# Patient Record
Sex: FEMALE | Race: WHITE | NOT HISPANIC OR LATINO | Employment: FULL TIME | URBAN - NONMETROPOLITAN AREA
[De-identification: names, ages, dates, MRNs, and addresses within clinical notes are randomized per-mention and may not be internally consistent; named-entity substitution may affect disease eponyms.]

---

## 2019-10-01 LAB
C TRACH RRNA SPEC DONR QL NAA+PROBE: NEGATIVE
EXTERNAL ABO GROUPING: NORMAL
EXTERNAL ANTIBODY SCREEN: NORMAL
EXTERNAL HEMATOCRIT: 38 %
EXTERNAL HEMOGLOBIN: 12.5 G/DL
EXTERNAL HEPATITIS B SURFACE ANTIGEN: NEGATIVE
EXTERNAL RH FACTOR: POSITIVE
EXTERNAL SYPHILIS RPR SCREEN: NORMAL
HCV AB S/CO SERPL IA: NEGATIVE
HIV 1+2 AB+HIV1 P24 AG SERPL QL IA: NORMAL
N GONORRHOEA DNA SPEC QL NAA+PROBE: NEGATIVE
RUBV IGG SERPL IA-ACNC: NORMAL

## 2019-11-14 ENCOUNTER — HOSPITAL ENCOUNTER (EMERGENCY)
Facility: HOSPITAL | Age: 24
Discharge: LEFT BEFORE DISCHARGE | End: 2019-11-15

## 2019-11-14 VITALS
HEIGHT: 63 IN | TEMPERATURE: 98.8 F | OXYGEN SATURATION: 100 % | BODY MASS INDEX: 24.45 KG/M2 | HEART RATE: 113 BPM | RESPIRATION RATE: 18 BRPM | WEIGHT: 138 LBS | SYSTOLIC BLOOD PRESSURE: 116 MMHG | DIASTOLIC BLOOD PRESSURE: 72 MMHG

## 2020-01-28 LAB
EXTERNAL HEMATOCRIT: 36 %
EXTERNAL HEMOGLOBIN: 11.6 G/DL
GLUCOSE 1H P 100 G GLC PO SERPL-MCNC: 94 MG/DL (ref 74–180)

## 2020-02-03 ENCOUNTER — HOSPITAL ENCOUNTER (OUTPATIENT)
Facility: HOSPITAL | Age: 25
Discharge: HOME | End: 2020-02-03
Attending: MIDWIFE | Admitting: MIDWIFE

## 2020-02-03 VITALS
DIASTOLIC BLOOD PRESSURE: 69 MMHG | WEIGHT: 148 LBS | TEMPERATURE: 98.9 F | SYSTOLIC BLOOD PRESSURE: 111 MMHG | OXYGEN SATURATION: 100 % | BODY MASS INDEX: 26.22 KG/M2 | HEART RATE: 103 BPM | RESPIRATION RATE: 16 BRPM

## 2020-02-03 LAB
BILIRUB BLD-MCNC: NEGATIVE MG/DL
CLARITY, POC: CLEAR
COLOR UR: YELLOW
GLUCOSE UR STRIP-MCNC: NEGATIVE MG/DL
KETONES UR QL: NEGATIVE
LEUKOCYTE EST, POC: NEGATIVE
NITRITE UR-MCNC: NEGATIVE MG/ML
PH UR: 5.5 [PH] (ref 5–8)
PROT UR STRIP-MCNC: NEGATIVE MG/DL
RBC # UR STRIP: NEGATIVE /UL
SP GR UR: 1.01 (ref 1–1.03)
UROBILINOGEN UR QL: NORMAL

## 2020-02-03 PROCEDURE — G0463 HOSPITAL OUTPT CLINIC VISIT: HCPCS

## 2020-02-03 PROCEDURE — 81002 URINALYSIS NONAUTO W/O SCOPE: CPT | Performed by: MIDWIFE

## 2020-02-03 RX ORDER — PRENATAL VIT NO.126/IRON/FOLIC 28MG-0.8MG
TABLET ORAL DAILY
COMMUNITY
End: 2021-04-01

## 2020-02-04 NOTE — SIGNIFICANT NOTE
Pt reports being constipated and was straining during bowel movement today and that is when she saw pink blood on the paper. Pt reports not seeing anymore blood since. Abd. soft non tender, no active bleeding, and no leaking of fluid. Positive fetal movement.

## 2020-02-04 NOTE — NON STRESS TEST
"Triage Note - Nursing Documentation  Labor and Delivery Admission Log    Della Garcia  : 1995  MRN: [id number]  CSN: [id number]    Date in / Time in:  2/3/2020  Time in:   Date out / Time out:    Time out:   Nurse: Eileen Luciano, RN    Patient Info: She is a 25 y.o. year old  at 25w1d with an MINH of 2020, Date entered prior to episode creation who was seen on the [facility].    Chief Complaint:   Chief Complaint   Patient presents with   • Vaginal Bleeding     \"I saw a small spot of pink blood when I wiped\".       Provider Instructions / Disposition: home self care. Pt is to follow up with OB provider  There is no problem list on file for this patient.      NST Documentation (Only applicable > 32 weeks):      "

## 2020-03-06 ENCOUNTER — INITIAL PRENATAL (OUTPATIENT)
Dept: OBSTETRICS AND GYNECOLOGY | Facility: CLINIC | Age: 25
End: 2020-03-06

## 2020-03-06 VITALS — WEIGHT: 156 LBS | BODY MASS INDEX: 27.63 KG/M2 | DIASTOLIC BLOOD PRESSURE: 68 MMHG | SYSTOLIC BLOOD PRESSURE: 114 MMHG

## 2020-03-06 DIAGNOSIS — Z34.93 PRENATAL CARE IN THIRD TRIMESTER: Primary | ICD-10-CM

## 2020-03-06 PROCEDURE — 99203 OFFICE O/P NEW LOW 30 MIN: CPT | Performed by: OBSTETRICS & GYNECOLOGY

## 2020-03-20 NOTE — PROGRESS NOTES
Subjective   Chief Complaint   Patient presents with   • Initial Prenatal Visit     Transfer from Dr. Quintero, patient states MINH is 20, she forgot records at home, she will bring them at next appointment.       Della Garcia is a 25 y.o. year old .  Patient's last menstrual period was 2019 (exact date).  She presents to be seen to initiate prenatal care.     No complaints.  No issues with this pregnancy.  First pregnancy.  No medications outside of a prenatal vitamin.  She does not have prenatal records with her today.  She reports normal prenatal labs and passage of her Glucola.    She denies nausea, emesis, fevers, chills, significant weight changes, hair/skin/nail changes, dysuria, urinary frequency, palpitations, myalgia, dyspnea.     Social History    Tobacco Use      Smoking status: Never Smoker      Smokeless tobacco: Never Used      Review of Systems  Pertinent items are noted in HPI, all other systems reviewed and negative    History:  The following portions of the patient's history were reviewed and updated as appropriate:vital signs, allergies, current medications, past medical history, past social history, past surgical history and problem list.    Current Outpatient Medications on File Prior to Visit   Medication Sig Dispense Refill   • Prenatal Vit-Fe Fumarate-FA (PRENATAL, CLASSIC, VITAMIN) 28-0.8 MG tablet tablet Take  by mouth Daily.       No current facility-administered medications on file prior to visit.           Objective   /68   Wt 70.8 kg (156 lb)   LMP 2019 (Exact Date)   BMI 27.63 kg/m²     Physical Exam:  General Appearance: alert, pleasant, appears stated age, interactive and cooperative  Head: normocephalic, without obvious abnormality and atraumatic  Eyes: lids and lashes normal and no icterus  Ears: ears appear intact with no abnormalities noted  Nose: nares normal, septum midline, mucosa normal and no drainage  Neck: suppple, trachea midline and no  thyromegaly  Back: no kyphosis present, no scoliosis present and range of motion normal  Lungs: respirations regular, respirations even and respirations unlabored, clear to auscultation bilaterally   Heart: regular rhythm and normal rate, normal S1, S2, no murmur, gallop, or rubs and no click  Breasts: Not performed.  Abdomen: no masses, no hepatomegaly, no splenomegaly, soft non-tender, no guarding and no rebound tenderness  Extremities: moves extremities well, no edema, no cyanosis and no redness  Skin: no bleeding, bruising or rash and no lesions noted  Lymph Nodes: no palpable adenopathy  Neurologic: Cranial Nerves cranial nerves 2 - 12 grossly intact, Speech normal content and execusion, Coordination normal  Psych: normal mood and affect, oriented to person, time and place, thought content organized and appropriate judgment    Lab Review   No data reviewed    Imaging   No data reviewed       Assessment   1. IUP at 29+6 weeks  2. Supervision of low risk pregnancy     Plan    1. The problem list for pregnancy was initiated today  2. Tests/Orders/Rx for today:  No orders of the defined types were placed in this encounter.    Meds Ordered: none  3. Testing for GC / Chlamydia / trichomonas was recently done and will not need to be repeated  4. Genetic testing: none  5. Information reviewed: exercise in pregnancy, nutrition in pregnancy, weight gain in pregnancy, work and travel restrictions during pregnancy, list of OTC medications acceptable in pregnancy and call coverage groups    Follow up: 2 week(s) for growth U/S.  Bring prenatal records for review next visit.    Darin Alexis MD  Obstetrics and Gynecology  [facility]

## 2020-03-24 ENCOUNTER — ROUTINE PRENATAL (OUTPATIENT)
Dept: OBSTETRICS AND GYNECOLOGY | Facility: CLINIC | Age: 25
End: 2020-03-24

## 2020-03-24 VITALS — DIASTOLIC BLOOD PRESSURE: 68 MMHG | WEIGHT: 162 LBS | BODY MASS INDEX: 28.7 KG/M2 | SYSTOLIC BLOOD PRESSURE: 120 MMHG

## 2020-03-24 DIAGNOSIS — Z34.93 PRENATAL CARE IN THIRD TRIMESTER: Primary | ICD-10-CM

## 2020-03-24 LAB
GLUCOSE UR STRIP-MCNC: NEGATIVE MG/DL
KETONES UR QL: NEGATIVE
NITRITE UR-MCNC: NEGATIVE MG/ML
PROT UR STRIP-MCNC: NEGATIVE MG/DL
RBC # UR STRIP: NEGATIVE /UL

## 2020-03-24 PROCEDURE — 99213 OFFICE O/P EST LOW 20 MIN: CPT | Performed by: OBSTETRICS & GYNECOLOGY

## 2020-03-24 NOTE — PROGRESS NOTES
Chief Complaint   Patient presents with   • Routine Prenatal Visit     Growth Scan, No C/C        HPI:   , 32w3d gestation reports doing well    ROS:  See Prenatal Episode/Flowsheet  /68   Wt 73.5 kg (162 lb)   LMP 2019 (Exact Date)   BMI 28.70 kg/m²      EXAM:  EXTREMITIES:  No swelling-See Prenatal Episode/Flowsheet    ABDOMEN:  FHTs/Movement noted-See Prenatal Episode/Flowsheet    URINE GLUCOSE/PROTEIN:  See Prenatal Episode/Flowsheet    PELVIC EXAM:  See Prenatal Episode/Flowsheet  CV:  Lungs:  GYN:    MDM:    Lab Results   Component Value Date    URINECX Final report 09/15/2019       U/S: Overall growth 52 percentile.  JULIANA is 10.33.  Vertex.  Posterior placenta.  Active female fetus.    1. IUP 32w3d  2. Routine care   3. Normal growth.

## 2020-04-21 ENCOUNTER — ROUTINE PRENATAL (OUTPATIENT)
Dept: OBSTETRICS AND GYNECOLOGY | Facility: CLINIC | Age: 25
End: 2020-04-21

## 2020-04-21 VITALS — SYSTOLIC BLOOD PRESSURE: 122 MMHG | BODY MASS INDEX: 30.82 KG/M2 | WEIGHT: 174 LBS | DIASTOLIC BLOOD PRESSURE: 70 MMHG

## 2020-04-21 DIAGNOSIS — Z36.85 ANTENATAL SCREENING FOR STREPTOCOCCUS B: Primary | ICD-10-CM

## 2020-04-21 DIAGNOSIS — Z34.03 ENCOUNTER FOR SUPERVISION OF NORMAL FIRST PREGNANCY IN THIRD TRIMESTER: ICD-10-CM

## 2020-04-21 PROCEDURE — 99213 OFFICE O/P EST LOW 20 MIN: CPT | Performed by: MIDWIFE

## 2020-04-21 NOTE — PROGRESS NOTES
Chief Complaint   Patient presents with   • Routine Prenatal Visit     GBS done today, c/o pelvic pain        HPI: Della is a  currently at 36w3d who today reports the following:  Contractions - No; Leaking - No; Vaginal bleeding -  No; Swelling of extremities - hands and feet intermittently; Baby is active - YES She is having some mild discomfort in right groin for several days. It is mostly dull and achy. She palns on breastfeeding and has gotten a pump. She is planning on epidural in labor.    ROS:   GI:   Nausea - No; Constipation - No   Neuro:  Headache - No; Visual disturbances - No.    Social History    Tobacco Use      Smoking status: Never Smoker      Smokeless tobacco: Never Used      EXAM:   Vitals:  See prenatal flowsheet, /70, Wt +12#   Abdomen:   See prenatal flowsheet as noted and reviewed, soft, nontender   Pelvic:  See prenatal flowsheet   Urine:  See prenatal flowsheet as noted and reviewed    MDM:  Impression: Supervision of low risk pregnancy  Groin discomfort   Tests done today: GBS testing   Topics discussed: breast feeding  kick counts and fetal movement  labor signs and symptoms   Tests next visit: none   Next visit: 1 weeks     This note was electronically signed.  Trini Brown, TRISH  2020

## 2020-04-23 LAB — GP B STREP DNA SPEC QL NAA+PROBE: NEGATIVE

## 2020-04-28 ENCOUNTER — ROUTINE PRENATAL (OUTPATIENT)
Dept: OBSTETRICS AND GYNECOLOGY | Facility: CLINIC | Age: 25
End: 2020-04-28

## 2020-04-28 VITALS — SYSTOLIC BLOOD PRESSURE: 122 MMHG | DIASTOLIC BLOOD PRESSURE: 70 MMHG | BODY MASS INDEX: 31.18 KG/M2 | WEIGHT: 176 LBS

## 2020-04-28 DIAGNOSIS — Z34.93 THIRD TRIMESTER PREGNANCY: Primary | ICD-10-CM

## 2020-04-28 PROCEDURE — 99213 OFFICE O/P EST LOW 20 MIN: CPT | Performed by: OBSTETRICS & GYNECOLOGY

## 2020-04-28 NOTE — PROGRESS NOTES
Chief Complaint   Patient presents with   • Routine Prenatal Visit     No Complaints/concerns, Good fetal movement         HPI:   , 37w3d gestation reports doing well    ROS:  See Prenatal Episode/Flowsheet  /70   Wt 79.8 kg (176 lb)   LMP 2019 (Exact Date)   BMI 31.18 kg/m²      EXAM:  EXTREMITIES:  No swelling-See Prenatal Episode/Flowsheet    ABDOMEN:  FHTs/Movement noted-See Prenatal Episode/Flowsheet    URINE GLUCOSE/PROTEIN:  See Prenatal Episode/Flowsheet    PELVIC EXAM:  See Prenatal Episode/Flowsheet  CV:  Lungs:  GYN:    MDM:    Lab Results   Component Value Date    HGB 11.6 2020    RUBELLAABIGG Immune 10/01/2019    HEPBSAG Negative 10/01/2019    ABO O 10/01/2019    RH Positive 10/01/2019    ABSCRN Normal 10/01/2019    [id number] Non-Reactive 10/01/2019    HEPCVIRUSABY Negative 10/01/2019    STREPGPB Negative 2020    URINECX Final report 09/15/2019       U/S: 52% @ 32 weeks      1. IUP 37w3d  2. Routine care   3. GBS negative  4. Check cervix next time

## 2020-05-04 ENCOUNTER — HOSPITAL ENCOUNTER (OUTPATIENT)
Facility: HOSPITAL | Age: 25
Discharge: HOME | End: 2020-05-04
Attending: MIDWIFE | Admitting: MIDWIFE

## 2020-05-04 VITALS
TEMPERATURE: 98.7 F | HEART RATE: 106 BPM | OXYGEN SATURATION: 99 % | HEIGHT: 63 IN | SYSTOLIC BLOOD PRESSURE: 120 MMHG | DIASTOLIC BLOOD PRESSURE: 75 MMHG | WEIGHT: 176 LBS | BODY MASS INDEX: 31.18 KG/M2 | RESPIRATION RATE: 14 BRPM

## 2020-05-04 LAB
BILIRUB BLD-MCNC: NEGATIVE MG/DL
CLARITY, POC: CLEAR
COLOR UR: YELLOW
GLUCOSE UR STRIP-MCNC: NEGATIVE MG/DL
KETONES UR QL: NEGATIVE
LEUKOCYTE EST, POC: NEGATIVE
NITRITE UR-MCNC: NEGATIVE MG/ML
PH UR: 6 [PH] (ref 5–8)
PROT UR STRIP-MCNC: NEGATIVE MG/DL
RBC # UR STRIP: NEGATIVE /UL
SP GR UR: 1 (ref 1–1.03)
UROBILINOGEN UR QL: NORMAL

## 2020-05-04 PROCEDURE — G0463 HOSPITAL OUTPT CLINIC VISIT: HCPCS

## 2020-05-04 PROCEDURE — 59025 FETAL NON-STRESS TEST: CPT

## 2020-05-04 PROCEDURE — 59025 FETAL NON-STRESS TEST: CPT | Performed by: MIDWIFE

## 2020-05-04 PROCEDURE — 81002 URINALYSIS NONAUTO W/O SCOPE: CPT | Performed by: MIDWIFE

## 2020-05-04 NOTE — NON STRESS TEST
Triage Note - Nursing Documentation  Labor and Delivery Admission Log    Della Garcia  : 1995  MRN: [id number]  CSN: [id number]    Date in / Time in:  2020  Time in: 1101    Date out / Time out:    Time out: 1154    Nurse: Rizwana Hendricks RN    Patient Info: She is a 25 y.o. year old  at 38w2d with an MINH of 2020, by Other [employer]asis who was seen on the [facility].    Chief Complaint:   Chief Complaint   Patient presents with   • Non-stress Test     Decreased fetal movement       Provider Instructions / Disposition: reactive nst, active FETUS, PO hydrated, sent home    There is no problem list on file for this patient.      NST Documentation (Only applicable > 32 weeks): Interpretation A  Nonstress Test Interpretation A: Reactive (20 1145 : Rizwana Hendricks RN)

## 2020-05-05 ENCOUNTER — ROUTINE PRENATAL (OUTPATIENT)
Dept: OBSTETRICS AND GYNECOLOGY | Facility: CLINIC | Age: 25
End: 2020-05-05

## 2020-05-05 VITALS — WEIGHT: 177 LBS | DIASTOLIC BLOOD PRESSURE: 72 MMHG | BODY MASS INDEX: 31.35 KG/M2 | SYSTOLIC BLOOD PRESSURE: 124 MMHG

## 2020-05-05 DIAGNOSIS — Z34.03 ENCOUNTER FOR SUPERVISION OF NORMAL FIRST PREGNANCY IN THIRD TRIMESTER: Primary | ICD-10-CM

## 2020-05-05 PROCEDURE — 99213 OFFICE O/P EST LOW 20 MIN: CPT | Performed by: OBSTETRICS & GYNECOLOGY

## 2020-05-05 NOTE — PROGRESS NOTES
Chief Complaint   Patient presents with   • Routine Prenatal Visit     Patient complains of contractions.        HPI: Della is a  currently at 38w3d who today reports the following:  Contractions - YES - but less than 4/hour AND no associated change in vaginal discharge; Leaking - No; Vaginal bleeding -  No; Heartburn - No.  Pt was seen on labor das yesterday with decreased fetal movement.  Pt reports good fetal movement now.  Pt with complaints of irregular contractions; worse during the night.  Pt with no spotting or leaking of fluid.  ROS:   GI:   Nausea - No; Constipation - No; Diarrhea - No.   Neuro:  Headache - No; Visual disturbances - No.    EXAM:   Vitals:  See prenatal flowsheet as noted and reviewed   General: Alert, cooperative, and does not appear in any distress   Lungs:  Respirations regular, even and unlabored   Abdomen:   See prenatal flowsheet as noted and reviewed     Uterus gravid, non-tender; no palpable masses     No guarding or rebound tenderness   Pelvic:  See prenatal flowsheet as noted and reviewed   Ext:  See prenatal flowsheet as noted and reviewed     Moves extremities well, no cyanosis and no redness   Skin:  No bleeding, bruising or rash and no lesions noted   Psych:  Normal mood and affect, oriented and thought content appropriate   Urine:  See prenatal flowsheet as noted and reviewed    Prenatal Labs  Lab Results   Component Value Date    HGB 11.6 2020    RUBELLAABIGG Immune 10/01/2019    HEPBSAG Negative 10/01/2019    ABO O 10/01/2019    RH Positive 10/01/2019    ABSCRN Normal 10/01/2019    [id number] Non-Reactive 10/01/2019    HEPCVIRUSABY Negative 10/01/2019    STREPGPB Negative 2020    URINECX Final report 09/15/2019       MDM:  Impression: Supervision of low risk pregnancy  Irregular contractions   GBS negative   Tests done today: none   Topics discussed: kick counts and fetal movement  labor signs and symptoms  PIH precautions   Tests next visit: none     This  note was electronically signed.  Alisa Harris M.D.

## 2020-05-06 ENCOUNTER — ANESTHESIA EVENT (OUTPATIENT)
Dept: LABOR AND DELIVERY | Facility: HOSPITAL | Age: 25
End: 2020-05-06

## 2020-05-06 ENCOUNTER — HOSPITAL ENCOUNTER (INPATIENT)
Facility: HOSPITAL | Age: 25
LOS: 2 days | Discharge: HOME | End: 2020-05-08
Attending: MIDWIFE | Admitting: NURSE PRACTITIONER

## 2020-05-06 ENCOUNTER — ANESTHESIA (OUTPATIENT)
Dept: LABOR AND DELIVERY | Facility: HOSPITAL | Age: 25
End: 2020-05-06

## 2020-05-06 PROBLEM — Z34.90 PREGNANCY: Status: ACTIVE | Noted: 2020-05-06

## 2020-05-06 LAB
A1 MICROGLOB PLACENTAL VAG QL: POSITIVE
ABO GROUP BLD: NORMAL
ABO GROUP BLD: NORMAL
BASOPHILS # BLD AUTO: 0.04 10*3/MM3 (ref 0–0.2)
BASOPHILS NFR BLD AUTO: 0.4 % (ref 0–1.5)
BLD GP AB SCN SERPL QL: NEGATIVE
DEPRECATED RDW RBC AUTO: 47.9 FL (ref 37–54)
EOSINOPHIL # BLD AUTO: 0.14 10*3/MM3 (ref 0–0.4)
EOSINOPHIL NFR BLD AUTO: 1.3 % (ref 0.3–6.2)
ERYTHROCYTE [DISTWIDTH] IN BLOOD BY AUTOMATED COUNT: 14.2 % (ref 12.3–15.4)
HCT VFR BLD AUTO: 36.1 % (ref 34–46.6)
HGB BLD-MCNC: 12.1 G/DL (ref 12–15.9)
IMM GRANULOCYTES # BLD AUTO: 0.11 10*3/MM3 (ref 0–0.05)
IMM GRANULOCYTES NFR BLD AUTO: 1 % (ref 0–0.5)
LYMPHOCYTES # BLD AUTO: 1.85 10*3/MM3 (ref 0.7–3.1)
LYMPHOCYTES NFR BLD AUTO: 17.6 % (ref 19.6–45.3)
MCH RBC QN AUTO: 30.9 PG (ref 26.6–33)
MCHC RBC AUTO-ENTMCNC: 33.5 G/DL (ref 31.5–35.7)
MCV RBC AUTO: 92.3 FL (ref 79–97)
MONOCYTES # BLD AUTO: 0.62 10*3/MM3 (ref 0.1–0.9)
MONOCYTES NFR BLD AUTO: 5.9 % (ref 5–12)
NEUTROPHILS # BLD AUTO: 7.73 10*3/MM3 (ref 1.7–7)
NEUTROPHILS NFR BLD AUTO: 73.8 % (ref 42.7–76)
NRBC BLD AUTO-RTO: 0 /100 WBC (ref 0–0.2)
PLATELET # BLD AUTO: 222 10*3/MM3 (ref 140–450)
PMV BLD AUTO: 10.5 FL (ref 6–12)
RBC # BLD AUTO: 3.91 10*6/MM3 (ref 3.77–5.28)
RH BLD: POSITIVE
RH BLD: POSITIVE
T&S EXPIRATION DATE: NORMAL
WBC NRBC COR # BLD: 10.49 10*3/MM3 (ref 3.4–10.8)

## 2020-05-06 PROCEDURE — 59025 FETAL NON-STRESS TEST: CPT

## 2020-05-06 PROCEDURE — 36415 COLL VENOUS BLD VENIPUNCTURE: CPT | Performed by: MIDWIFE

## 2020-05-06 PROCEDURE — 86900 BLOOD TYPING SEROLOGIC ABO: CPT | Performed by: MIDWIFE

## 2020-05-06 PROCEDURE — 25010000002 MORPHINE PER 10 MG: Performed by: MIDWIFE

## 2020-05-06 PROCEDURE — 86901 BLOOD TYPING SEROLOGIC RH(D): CPT | Performed by: MIDWIFE

## 2020-05-06 PROCEDURE — 86900 BLOOD TYPING SEROLOGIC ABO: CPT

## 2020-05-06 PROCEDURE — G0463 HOSPITAL OUTPT CLINIC VISIT: HCPCS

## 2020-05-06 PROCEDURE — 85025 COMPLETE CBC W/AUTO DIFF WBC: CPT | Performed by: MIDWIFE

## 2020-05-06 PROCEDURE — 51703 INSERT BLADDER CATH COMPLEX: CPT

## 2020-05-06 PROCEDURE — 86850 RBC ANTIBODY SCREEN: CPT | Performed by: MIDWIFE

## 2020-05-06 PROCEDURE — 84112 EVAL AMNIOTIC FLUID PROTEIN: CPT | Performed by: MIDWIFE

## 2020-05-06 PROCEDURE — 86901 BLOOD TYPING SEROLOGIC RH(D): CPT

## 2020-05-06 RX ORDER — PROMETHAZINE HYDROCHLORIDE 12.5 MG/1
12.5 SUPPOSITORY RECTAL EVERY 4 HOURS PRN
Status: DISCONTINUED | OUTPATIENT
Start: 2020-05-06 | End: 2020-05-07 | Stop reason: HOSPADM

## 2020-05-06 RX ORDER — MORPHINE SULFATE 4 MG/ML
4 INJECTION, SOLUTION INTRAMUSCULAR; INTRAVENOUS EVERY 4 HOURS PRN
Status: DISCONTINUED | OUTPATIENT
Start: 2020-05-06 | End: 2020-05-07 | Stop reason: HOSPADM

## 2020-05-06 RX ORDER — ONDANSETRON 2 MG/ML
4 INJECTION INTRAMUSCULAR; INTRAVENOUS ONCE AS NEEDED
Status: DISCONTINUED | OUTPATIENT
Start: 2020-05-06 | End: 2020-05-07 | Stop reason: HOSPADM

## 2020-05-06 RX ORDER — MORPHINE SULFATE 2 MG/ML
6 INJECTION, SOLUTION INTRAMUSCULAR; INTRAVENOUS EVERY 4 HOURS PRN
Status: DISCONTINUED | OUTPATIENT
Start: 2020-05-06 | End: 2020-05-07 | Stop reason: HOSPADM

## 2020-05-06 RX ORDER — PROMETHAZINE HYDROCHLORIDE 25 MG/ML
12.5 INJECTION, SOLUTION INTRAMUSCULAR; INTRAVENOUS EVERY 4 HOURS PRN
Status: DISCONTINUED | OUTPATIENT
Start: 2020-05-06 | End: 2020-05-07 | Stop reason: HOSPADM

## 2020-05-06 RX ORDER — PROMETHAZINE HYDROCHLORIDE 25 MG/ML
25 INJECTION, SOLUTION INTRAMUSCULAR; INTRAVENOUS EVERY 4 HOURS PRN
Status: DISCONTINUED | OUTPATIENT
Start: 2020-05-06 | End: 2020-05-07 | Stop reason: HOSPADM

## 2020-05-06 RX ORDER — SODIUM CHLORIDE, SODIUM LACTATE, POTASSIUM CHLORIDE, CALCIUM CHLORIDE 600; 310; 30; 20 MG/100ML; MG/100ML; MG/100ML; MG/100ML
125 INJECTION, SOLUTION INTRAVENOUS CONTINUOUS
Status: DISCONTINUED | OUTPATIENT
Start: 2020-05-06 | End: 2020-05-07

## 2020-05-06 RX ORDER — PROMETHAZINE HYDROCHLORIDE 25 MG/ML
25 INJECTION, SOLUTION INTRAMUSCULAR; INTRAVENOUS ONCE
Status: DISCONTINUED | OUTPATIENT
Start: 2020-05-06 | End: 2020-05-06

## 2020-05-06 RX ORDER — SODIUM CHLORIDE 0.9 % (FLUSH) 0.9 %
1-10 SYRINGE (ML) INJECTION AS NEEDED
Status: DISCONTINUED | OUTPATIENT
Start: 2020-05-06 | End: 2020-05-07 | Stop reason: HOSPADM

## 2020-05-06 RX ORDER — TRISODIUM CITRATE DIHYDRATE AND CITRIC ACID MONOHYDRATE 500; 334 MG/5ML; MG/5ML
30 SOLUTION ORAL ONCE
Status: DISCONTINUED | OUTPATIENT
Start: 2020-05-06 | End: 2020-05-07 | Stop reason: HOSPADM

## 2020-05-06 RX ORDER — LIDOCAINE HYDROCHLORIDE 10 MG/ML
5 INJECTION, SOLUTION EPIDURAL; INFILTRATION; INTRACAUDAL; PERINEURAL AS NEEDED
Status: DISCONTINUED | OUTPATIENT
Start: 2020-05-06 | End: 2020-05-07 | Stop reason: HOSPADM

## 2020-05-06 RX ORDER — SODIUM CHLORIDE 0.9 % (FLUSH) 0.9 %
3 SYRINGE (ML) INJECTION EVERY 12 HOURS SCHEDULED
Status: DISCONTINUED | OUTPATIENT
Start: 2020-05-06 | End: 2020-05-07 | Stop reason: HOSPADM

## 2020-05-06 RX ORDER — PROMETHAZINE HYDROCHLORIDE 12.5 MG/1
12.5 TABLET ORAL EVERY 4 HOURS PRN
Status: DISCONTINUED | OUTPATIENT
Start: 2020-05-06 | End: 2020-05-07 | Stop reason: HOSPADM

## 2020-05-06 RX ORDER — OXYTOCIN/0.9 % SODIUM CHLORIDE 30/500 ML
1 PLASTIC BAG, INJECTION (ML) INTRAVENOUS
Status: DISCONTINUED | OUTPATIENT
Start: 2020-05-06 | End: 2020-05-07

## 2020-05-06 RX ADMIN — SODIUM CHLORIDE, POTASSIUM CHLORIDE, SODIUM LACTATE AND CALCIUM CHLORIDE 125 ML/HR: 600; 310; 30; 20 INJECTION, SOLUTION INTRAVENOUS at 17:25

## 2020-05-06 RX ADMIN — Medication 2 MILLI-UNITS/MIN: at 18:44

## 2020-05-06 RX ADMIN — MORPHINE SULFATE 4 MG: 4 INJECTION, SOLUTION INTRAMUSCULAR; INTRAVENOUS at 22:02

## 2020-05-06 NOTE — ANESTHESIA PREPROCEDURE EVALUATION
Anesthesia Evaluation     Patient summary reviewed and Nursing notes reviewed   NPO Solid Status: > 8 hours  NPO Liquid Status: > 8 hours           Airway   Mallampati: I  TM distance: >3 FB  Neck ROM: full  No difficulty expected  Dental - normal exam     Pulmonary - negative pulmonary ROS and normal exam   Cardiovascular - negative cardio ROS and normal exam        Neuro/Psych- negative ROS  GI/Hepatic/Renal/Endo - negative ROS     Musculoskeletal (-) negative ROS    Abdominal  - normal exam    Bowel sounds: normal.   Substance History - negative use     OB/GYN    (+) Pregnant,         Other                        Anesthesia Plan    ASA 2 - emergent     epidural     intravenous induction     Anesthetic plan, all risks, benefits, and alternatives have been provided, discussed and informed consent has been obtained with: patient.    Plan discussed with attending.

## 2020-05-06 NOTE — H&P
" Fam Garcia  : 1995  MRN: [id number]  CSN: [id number]    History and Physical    Subjective   Della Garcia is a 25 y.o. year old  with an Estimated Date of Delivery: 20 currently at 38w4d presenting with irregular contractions, leaking fluid and normal fetal movement. Her contractions started this am, irregular. Her water broke at 1600.     Prenatal care has been with Dr Quintero in [place] and then transfer to [facility] @ 29w6d.  It has been benign. First PNV on 10/1/2019 @ 7 weeks with 12 visits. Weight gain = 39 lbs.    OB History    Para Term  AB Living   1 0 0 0 0 0   SAB TAB Ectopic Molar Multiple Live Births   0 0 0 0 0 0      # Outcome Date GA Lbr Eladio/2nd Weight Sex Delivery Anes PTL Lv   1 Current              Past Medical History:   Diagnosis Date   • Varicella      History reviewed. No pertinent surgical history.    Allergies   Allergen Reactions   • Cephalosporins Rash   • Albuterol Hives     Social History    Tobacco Use      Smoking status: Never Smoker      Smokeless tobacco: Never Used    Review of Systems   Constitutional: Negative.    Respiratory: Negative.    Cardiovascular: Negative.    Gastrointestinal: Negative.    Genitourinary: Negative.    Musculoskeletal: Negative.    Skin: Negative.    Neurological: Negative.    Psychiatric/Behavioral: Negative.    All other systems reviewed and are negative.        Objective   /76   Pulse 106   Temp 98.9 °F (37.2 °C) (Oral)   Ht 160 cm (63\")   Wt 80.3 kg (177 lb)   LMP 2019 (Exact Date)   SpO2 98%   Breastfeeding Yes   BMI 31.35 kg/m²   General: well developed; well nourished  no acute distress   Neck:    Heart:   supple and no masses  normal apical impulse  regular rate and rhythm   Lungs: breathing is unlabored  clear to auscultation bilaterally   Abdomen: soft, non-tender; no masses  gravid  EFW: 7#   FHT's: reassuring, reactive and category 1   Cervix: was checked (by me): 2 cm " / 60 % / -1 posterior soft  Leaking fluid   Presentation: cephalic   Contractions: irregular - external monitors used   Extremities:   normal appearance with no cyanosis or edema and no calf tenderness   Skin:  Skin color, texture, turgor normal. No rashes or lesions or Skin warm and dry   Psych:  Normal. and Alert and oriented, appropriate affect.     Prenatal Labs  Lab Results   Component Value Date    HGB 12.1 05/06/2020    HEPBSAG Negative 10/01/2019    ABSCRN Normal 10/01/2019    [id number] Non-Reactive 10/01/2019    HEPCVIRUSABY Negative 10/01/2019    STREPGPB Negative 04/21/2020    URINECX Final report 09/15/2019       Current Labs Reviewed   Lab Results (last 24 hours)     Procedure Component Value Units Date/Time    CBC & Differential [[id number]] Collected:  05/06/20 1743    Specimen:  Blood Updated:  05/06/20 1755    Narrative:       The following orders were created for panel order CBC & Differential.  Procedure                               Abnormality         Status                     ---------                               -----------         ------                     CBC Auto Differential[[id number]]        Abnormal            Final result                 Please view results for these tests on the individual orders.    CBC Auto Differential [[id number]]  (Abnormal) Collected:  05/06/20 1743    Specimen:  Blood Updated:  05/06/20 1755     WBC 10.49 10*3/mm3      RBC 3.91 10*6/mm3      Hemoglobin 12.1 g/dL      Hematocrit 36.1 %      MCV 92.3 fL      MCH 30.9 pg      MCHC 33.5 g/dL      RDW 14.2 %      RDW-SD 47.9 fl      MPV 10.5 fL      Platelets 222 10*3/mm3      Neutrophil % 73.8 %      Lymphocyte % 17.6 %      Monocyte % 5.9 %      Eosinophil % 1.3 %      Basophil % 0.4 %      Immature Grans % 1.0 %      Neutrophils, Absolute 7.73 10*3/mm3      Lymphocytes, Absolute 1.85 10*3/mm3      Monocytes, Absolute 0.62 10*3/mm3      Eosinophils, Absolute 0.14 10*3/mm3      Basophils, Absolute 0.04 10*3/mm3       Immature Grans, Absolute 0.11 10*3/mm3      nRBC 0.0 /100 WBC     PAMG-1, Rapid Assay For ROM - Amniotic Fluid, Amniotic Sac [[id number]]  (Abnormal) Collected:  20 1655    Specimen:  Amniotic Fluid from Amniotic Sac Updated:  20 1754     Rupture of Membranes Positive    Narrative:       Performed by Belgica Willson RN             ASSESSMENT  1. IUP at 38w4d  2. Group B strep status: negative  3. SROM  4. Reactive NST    PLAN  1. Admit to LH  2. Augment with pitocin   3. Epidural if needed  4. Anticipate     Trini Brown, APRN  2020  18:35

## 2020-05-07 PROCEDURE — 0HQ9XZZ REPAIR PERINEUM SKIN, EXTERNAL APPROACH: ICD-10-PCS | Performed by: NURSE PRACTITIONER

## 2020-05-07 PROCEDURE — 59410 OBSTETRICAL CARE: CPT | Performed by: NURSE PRACTITIONER

## 2020-05-07 PROCEDURE — 25010000002 FENTANYL CITRATE (PF) 250 MCG/5ML SOLUTION 5 ML VIAL: Performed by: NURSE ANESTHETIST, CERTIFIED REGISTERED

## 2020-05-07 PROCEDURE — C1755 CATHETER, INTRASPINAL: HCPCS | Performed by: NURSE ANESTHETIST, CERTIFIED REGISTERED

## 2020-05-07 PROCEDURE — 25010000002 ROPIVACAINE PER 1 MG: Performed by: NURSE ANESTHETIST, CERTIFIED REGISTERED

## 2020-05-07 RX ORDER — IBUPROFEN 800 MG/1
800 TABLET ORAL EVERY 8 HOURS SCHEDULED
Status: DISCONTINUED | OUTPATIENT
Start: 2020-05-07 | End: 2020-05-08 | Stop reason: HOSPADM

## 2020-05-07 RX ORDER — ACETAMINOPHEN 325 MG/1
650 TABLET ORAL ONCE AS NEEDED
Status: DISCONTINUED | OUTPATIENT
Start: 2020-05-07 | End: 2020-05-07 | Stop reason: HOSPADM

## 2020-05-07 RX ORDER — PROMETHAZINE HYDROCHLORIDE 25 MG/ML
12.5 INJECTION, SOLUTION INTRAMUSCULAR; INTRAVENOUS EVERY 6 HOURS PRN
Status: DISCONTINUED | OUTPATIENT
Start: 2020-05-07 | End: 2020-05-08 | Stop reason: HOSPADM

## 2020-05-07 RX ORDER — HYDROCODONE BITARTRATE AND ACETAMINOPHEN 7.5; 325 MG/1; MG/1
1 TABLET ORAL EVERY 4 HOURS PRN
Status: DISCONTINUED | OUTPATIENT
Start: 2020-05-07 | End: 2020-05-08 | Stop reason: HOSPADM

## 2020-05-07 RX ORDER — SODIUM CHLORIDE 0.9 % (FLUSH) 0.9 %
1-10 SYRINGE (ML) INJECTION AS NEEDED
Status: DISCONTINUED | OUTPATIENT
Start: 2020-05-07 | End: 2020-05-08 | Stop reason: HOSPADM

## 2020-05-07 RX ORDER — HYDROCODONE BITARTRATE AND ACETAMINOPHEN 5; 325 MG/1; MG/1
2 TABLET ORAL ONCE AS NEEDED
Status: DISCONTINUED | OUTPATIENT
Start: 2020-05-07 | End: 2020-05-07 | Stop reason: HOSPADM

## 2020-05-07 RX ORDER — OXYTOCIN/0.9 % SODIUM CHLORIDE 30/500 ML
85 PLASTIC BAG, INJECTION (ML) INTRAVENOUS ONCE
Status: COMPLETED | OUTPATIENT
Start: 2020-05-07 | End: 2020-05-07

## 2020-05-07 RX ORDER — OXYTOCIN/0.9 % SODIUM CHLORIDE 30/500 ML
650 PLASTIC BAG, INJECTION (ML) INTRAVENOUS ONCE
Status: DISCONTINUED | OUTPATIENT
Start: 2020-05-07 | End: 2020-05-07 | Stop reason: HOSPADM

## 2020-05-07 RX ORDER — MORPHINE SULFATE 2 MG/ML
2 INJECTION, SOLUTION INTRAMUSCULAR; INTRAVENOUS ONCE AS NEEDED
Status: DISCONTINUED | OUTPATIENT
Start: 2020-05-07 | End: 2020-05-07 | Stop reason: HOSPADM

## 2020-05-07 RX ORDER — CARBOPROST TROMETHAMINE 250 UG/ML
250 INJECTION, SOLUTION INTRAMUSCULAR AS NEEDED
Status: DISCONTINUED | OUTPATIENT
Start: 2020-05-07 | End: 2020-05-07 | Stop reason: HOSPADM

## 2020-05-07 RX ORDER — ONDANSETRON 4 MG/1
4 TABLET, FILM COATED ORAL ONCE AS NEEDED
Status: DISCONTINUED | OUTPATIENT
Start: 2020-05-07 | End: 2020-05-07 | Stop reason: HOSPADM

## 2020-05-07 RX ORDER — ONDANSETRON 4 MG/1
4 TABLET, FILM COATED ORAL EVERY 8 HOURS PRN
Status: DISCONTINUED | OUTPATIENT
Start: 2020-05-07 | End: 2020-05-08 | Stop reason: HOSPADM

## 2020-05-07 RX ORDER — ONDANSETRON 2 MG/ML
4 INJECTION INTRAMUSCULAR; INTRAVENOUS EVERY 6 HOURS PRN
Status: DISCONTINUED | OUTPATIENT
Start: 2020-05-07 | End: 2020-05-08 | Stop reason: HOSPADM

## 2020-05-07 RX ORDER — HYDROCORTISONE 25 MG/G
1 CREAM TOPICAL AS NEEDED
Status: DISCONTINUED | OUTPATIENT
Start: 2020-05-07 | End: 2020-05-08 | Stop reason: HOSPADM

## 2020-05-07 RX ORDER — PROMETHAZINE HYDROCHLORIDE 12.5 MG/1
12.5 SUPPOSITORY RECTAL EVERY 6 HOURS PRN
Status: DISCONTINUED | OUTPATIENT
Start: 2020-05-07 | End: 2020-05-08 | Stop reason: HOSPADM

## 2020-05-07 RX ORDER — MORPHINE SULFATE 4 MG/ML
4 INJECTION, SOLUTION INTRAMUSCULAR; INTRAVENOUS ONCE AS NEEDED
Status: DISCONTINUED | OUTPATIENT
Start: 2020-05-07 | End: 2020-05-07 | Stop reason: HOSPADM

## 2020-05-07 RX ORDER — IBUPROFEN 600 MG/1
600 TABLET ORAL EVERY 6 HOURS PRN
Status: DISCONTINUED | OUTPATIENT
Start: 2020-05-07 | End: 2020-05-08

## 2020-05-07 RX ORDER — PRENATAL VIT/IRON FUM/FOLIC AC 27MG-0.8MG
1 TABLET ORAL DAILY
Status: DISCONTINUED | OUTPATIENT
Start: 2020-05-07 | End: 2020-05-08 | Stop reason: HOSPADM

## 2020-05-07 RX ORDER — ACETAMINOPHEN 500 MG
1000 TABLET ORAL EVERY 8 HOURS SCHEDULED
Status: DISCONTINUED | OUTPATIENT
Start: 2020-05-07 | End: 2020-05-08 | Stop reason: HOSPADM

## 2020-05-07 RX ORDER — ONDANSETRON 2 MG/ML
4 INJECTION INTRAMUSCULAR; INTRAVENOUS ONCE AS NEEDED
Status: DISCONTINUED | OUTPATIENT
Start: 2020-05-07 | End: 2020-05-07 | Stop reason: HOSPADM

## 2020-05-07 RX ORDER — DIPHENHYDRAMINE HCL 25 MG
25 CAPSULE ORAL NIGHTLY PRN
Status: DISCONTINUED | OUTPATIENT
Start: 2020-05-07 | End: 2020-05-08 | Stop reason: HOSPADM

## 2020-05-07 RX ORDER — MISOPROSTOL 200 UG/1
800 TABLET ORAL AS NEEDED
Status: DISCONTINUED | OUTPATIENT
Start: 2020-05-07 | End: 2020-05-07 | Stop reason: HOSPADM

## 2020-05-07 RX ORDER — HYDROCODONE BITARTRATE AND ACETAMINOPHEN 5; 325 MG/1; MG/1
1 TABLET ORAL EVERY 4 HOURS PRN
Status: DISCONTINUED | OUTPATIENT
Start: 2020-05-07 | End: 2020-05-08 | Stop reason: HOSPADM

## 2020-05-07 RX ORDER — BISACODYL 10 MG
10 SUPPOSITORY, RECTAL RECTAL DAILY PRN
Status: DISCONTINUED | OUTPATIENT
Start: 2020-05-08 | End: 2020-05-08 | Stop reason: HOSPADM

## 2020-05-07 RX ORDER — PROMETHAZINE HYDROCHLORIDE 25 MG/1
25 TABLET ORAL EVERY 6 HOURS PRN
Status: DISCONTINUED | OUTPATIENT
Start: 2020-05-07 | End: 2020-05-08 | Stop reason: HOSPADM

## 2020-05-07 RX ORDER — DOCUSATE SODIUM 100 MG/1
100 CAPSULE, LIQUID FILLED ORAL 2 TIMES DAILY PRN
Status: DISCONTINUED | OUTPATIENT
Start: 2020-05-07 | End: 2020-05-08 | Stop reason: HOSPADM

## 2020-05-07 RX ORDER — METHYLERGONOVINE MALEATE 0.2 MG/ML
200 INJECTION INTRAVENOUS ONCE AS NEEDED
Status: DISCONTINUED | OUTPATIENT
Start: 2020-05-07 | End: 2020-05-07 | Stop reason: HOSPADM

## 2020-05-07 RX ADMIN — Medication 85 ML/HR: at 08:18

## 2020-05-07 RX ADMIN — SODIUM CHLORIDE, POTASSIUM CHLORIDE, SODIUM LACTATE AND CALCIUM CHLORIDE 1000 ML: 600; 310; 30; 20 INJECTION, SOLUTION INTRAVENOUS at 01:05

## 2020-05-07 RX ADMIN — ACETAMINOPHEN 1000 MG: 500 TABLET, COATED ORAL at 22:11

## 2020-05-07 RX ADMIN — IBUPROFEN 600 MG: 600 TABLET ORAL at 12:01

## 2020-05-07 RX ADMIN — SODIUM CHLORIDE, POTASSIUM CHLORIDE, SODIUM LACTATE AND CALCIUM CHLORIDE 125 ML/HR: 600; 310; 30; 20 INJECTION, SOLUTION INTRAVENOUS at 03:58

## 2020-05-07 RX ADMIN — IBUPROFEN 800 MG: 800 TABLET ORAL at 20:00

## 2020-05-07 RX ADMIN — BENZOCAINE AND LEVOMENTHOL: 200; 5 SPRAY TOPICAL at 12:02

## 2020-05-07 RX ADMIN — SODIUM CHLORIDE, POTASSIUM CHLORIDE, SODIUM LACTATE AND CALCIUM CHLORIDE 125 ML/HR: 600; 310; 30; 20 INJECTION, SOLUTION INTRAVENOUS at 01:30

## 2020-05-07 RX ADMIN — ROPIVACAINE HYDROCHLORIDE 14 ML/HR: 2 INJECTION, SOLUTION EPIDURAL; INFILTRATION at 02:17

## 2020-05-07 NOTE — ANESTHESIA PROCEDURE NOTES
Labor Epidural      Patient location during procedure: OB  Indication:at surgeon's request  Performed By  CRNA: Rachid Fischer CRNA  Preanesthetic Checklist  Completed: patient identified, site marked, surgical consent, pre-op evaluation, timeout performed, IV checked, risks and benefits discussed and monitors and equipment checked  Prep:  Pt Position:sitting  Sterile Tech:cap, gloves, mask and sterile barrier  Prep:chlorhexidine gluconate and isopropyl alcohol  Monitoring:blood pressure monitoring and continuous pulse oximetry  Epidural Block Procedure:  Approach:midline  Guidance:landmark technique and palpation technique  Location:L4-L5  Needle Type:Tuohy  Needle Gauge:18 G  Loss of Resistance Medium: saline  Loss of Resistance: 6cm  Cath Depth at skin:10 cm  Paresthesia: right and transient  Aspiration:negative  Test Dose:negative  Post Assessment:  Dressing:secured with tape  Pt Tolerance:patient tolerated the procedure well with no apparent complications  Complications:no

## 2020-05-07 NOTE — PLAN OF CARE
Problem: Patient Care Overview  Goal: Plan of Care Review  Outcome: Ongoing (interventions implemented as appropriate)  Flowsheets (Taken 5/7/2020 1700)  Progress: improving  Plan of Care Reviewed With: patient  Outcome Summary: VSS. Voiding and Stooling within defined limits.

## 2020-05-07 NOTE — PLAN OF CARE
Problem: Patient Care Overview  Goal: Plan of Care Review  Outcome: Ongoing (interventions implemented as appropriate)  Flowsheets  Taken 5/6/2020 2311  Progress: improving  Outcome Summary: Patient progressing adequately in labor, VSS, labor pain being managed with iv pain meds and epidural placement  Taken 5/6/2020 1930  Plan of Care Reviewed With: patient;significant other  Goal: Individualization and Mutuality  Outcome: Ongoing (interventions implemented as appropriate)  Flowsheets (Taken 5/7/2020 0319)  Patient Specific Goals (Include Timeframe): epidural for pain management  Patient Specific Interventions: epidural placed per CRNA  Patient Specific Preferences: patient would like epidural  Goal: Discharge Needs Assessment  Outcome: Ongoing (interventions implemented as appropriate)  Flowsheets  Taken 5/6/2020 1800 by Bozena Andrews, RN  Equipment Currently Used at Home: none  Taken 5/6/2020 1759 by Bozena Andrews, RN  Transportation Anticipated: car, drives self  Patient/Family Anticipated Services at Transition: none  Patient/Family Anticipates Transition to: home with family  Taken 5/7/2020 0319 by Rose Delgado RN  Concerns to be Addressed: no discharge needs identified  Readmission Within the Last 30 Days: no previous admission in last 30 days  Goal: Interprofessional Rounds/Family Conf  Outcome: Ongoing (interventions implemented as appropriate)  Flowsheets (Taken 5/7/2020 0319)  Participants: advanced practice nurse; nursing; family; pharmacy; patient; physician     Problem: Labor (Cervical Ripen, Induct, Augment) (Adult,Obstetrics,Pediatric)  Goal: Signs and Symptoms of Listed Potential Problems Will be Absent, Minimized or Managed (Labor)  Outcome: Ongoing (interventions implemented as appropriate)  Flowsheets (Taken 5/7/2020 0319)  Problems Assessed (Labor): all  Problems Present (Labor): none

## 2020-05-07 NOTE — ANESTHESIA POSTPROCEDURE EVALUATION
Patient: Della Garcia    Procedure Summary     Date:  05/07/20 Room / Location:      Anesthesia Start:  0202 Anesthesia Stop:  0744    Procedure:  LABOR ANALGESIA Diagnosis:      Scheduled Providers:   Provider:  Rachid Fischer CRNA    Anesthesia Type:  epidural ASA Status:  2 - Emergent          Anesthesia Type: epidural    Vitals  Vitals Value Taken Time   /70 5/7/2020  8:00 AM   Temp     Pulse 112 5/7/2020  8:00 AM   Resp     SpO2 98 % 5/7/2020  2:40 AM   Vitals shown include unvalidated device data.        Post Anesthesia Care and Evaluation    Patient location during evaluation: bedside  Patient participation: complete - patient participated  Level of consciousness: awake and alert  Pain score: 0  Pain management: satisfactory to patient  Airway patency: patent  Anesthetic complications: No anesthetic complications  PONV Status: none  Cardiovascular status: acceptable and hemodynamically stable  Respiratory status: acceptable  Hydration status: acceptable

## 2020-05-07 NOTE — L&D DELIVERY NOTE
Fam  Vaginal Delivery Note    Delivery     Delivery: Vaginal, Spontaneous     YOB: 2018    Time of Birth:  Gestational Age 7:42 AM   38w5d     Anesthesia: Epidural     Delivering clinician: Shaneka Miner    Forceps?   No   Vacuum? No    Shoulder dystocia present: No        Delivery narrative:      progressed to complete and pushed well.  of viable female over an intact perineum.  Infant bulb sx'd and spontaneous lusty cry and vigorous - to moms abdomen and dried. Delayed cord clamping / double clamped and cut per FOB.  Spontaneous delivery of placenta - intact - 20 units pitocin in IVF's infusing. F/F with message.  Vaginal inspection: 1st degree vaginal laceration and rt labial laceration.  Sutured with chrooo sh.   cc    Infant    Findings: female  infant     Infant observations: Weight: 2778 g (6 lb 2 oz)   Length: 19  in  Observations/Comments:         Apgars: 8   @ 1 minute /    9   @ 5 minutes   Infant Name: Brandi     Placenta, Cord, and Fluid    Placenta delivered  Spontaneous  at   2020  7:48 AM     Cord: 3 vessels  present.   Nuchal Cord?  no   Cord blood obtained: Yes    Cord gases obtained:  No    Cord gas results: Venous:  No results found for: PHCVEN    Arterial:  No results found for: PHCART     Repair    Episiotomy: NO - See note above    Lacerations:    Estimated Blood Loss:             Complications  none    Disposition  Mother to Mother Baby/Postpartum  in stable condition currently.  Baby to remains with mom  in stable condition currently.      Shaneka Miner CNM  20  10:50

## 2020-05-08 VITALS
BODY MASS INDEX: 31.36 KG/M2 | TEMPERATURE: 98.3 F | SYSTOLIC BLOOD PRESSURE: 123 MMHG | DIASTOLIC BLOOD PRESSURE: 73 MMHG | RESPIRATION RATE: 20 BRPM | HEIGHT: 63 IN | HEART RATE: 92 BPM | OXYGEN SATURATION: 98 % | WEIGHT: 177 LBS

## 2020-05-08 PROBLEM — B02.9 HERPES ZOSTER WITHOUT COMPLICATION: Status: ACTIVE | Noted: 2018-03-29

## 2020-05-08 LAB
BASOPHILS # BLD AUTO: 0.07 10*3/MM3 (ref 0–0.2)
BASOPHILS NFR BLD AUTO: 0.6 % (ref 0–1.5)
DEPRECATED RDW RBC AUTO: 50.3 FL (ref 37–54)
EOSINOPHIL # BLD AUTO: 0.2 10*3/MM3 (ref 0–0.4)
EOSINOPHIL NFR BLD AUTO: 1.7 % (ref 0.3–6.2)
ERYTHROCYTE [DISTWIDTH] IN BLOOD BY AUTOMATED COUNT: 14.6 % (ref 12.3–15.4)
HCT VFR BLD AUTO: 33.9 % (ref 34–46.6)
HGB BLD-MCNC: 11.3 G/DL (ref 12–15.9)
IMM GRANULOCYTES # BLD AUTO: 0.1 10*3/MM3 (ref 0–0.05)
IMM GRANULOCYTES NFR BLD AUTO: 0.8 % (ref 0–0.5)
LYMPHOCYTES # BLD AUTO: 2.85 10*3/MM3 (ref 0.7–3.1)
LYMPHOCYTES NFR BLD AUTO: 23.7 % (ref 19.6–45.3)
MCH RBC QN AUTO: 31.2 PG (ref 26.6–33)
MCHC RBC AUTO-ENTMCNC: 33.3 G/DL (ref 31.5–35.7)
MCV RBC AUTO: 93.6 FL (ref 79–97)
MONOCYTES # BLD AUTO: 0.93 10*3/MM3 (ref 0.1–0.9)
MONOCYTES NFR BLD AUTO: 7.7 % (ref 5–12)
NEUTROPHILS # BLD AUTO: 7.88 10*3/MM3 (ref 1.7–7)
NEUTROPHILS NFR BLD AUTO: 65.5 % (ref 42.7–76)
NRBC BLD AUTO-RTO: 0 /100 WBC (ref 0–0.2)
PLATELET # BLD AUTO: 205 10*3/MM3 (ref 140–450)
PMV BLD AUTO: 10.5 FL (ref 6–12)
RBC # BLD AUTO: 3.62 10*6/MM3 (ref 3.77–5.28)
WBC NRBC COR # BLD: 12.03 10*3/MM3 (ref 3.4–10.8)

## 2020-05-08 PROCEDURE — 99024 POSTOP FOLLOW-UP VISIT: CPT | Performed by: MIDWIFE

## 2020-05-08 PROCEDURE — 85025 COMPLETE CBC W/AUTO DIFF WBC: CPT | Performed by: NURSE PRACTITIONER

## 2020-05-08 RX ORDER — FERROUS SULFATE TAB EC 324 MG (65 MG FE EQUIVALENT) 324 (65 FE) MG
324 TABLET DELAYED RESPONSE ORAL 2 TIMES DAILY WITH MEALS
Status: DISCONTINUED | OUTPATIENT
Start: 2020-05-08 | End: 2020-05-08 | Stop reason: HOSPADM

## 2020-05-08 RX ORDER — IBUPROFEN 800 MG/1
800 TABLET ORAL EVERY 8 HOURS PRN
Qty: 60 TABLET | Refills: 0 | OUTPATIENT
Start: 2020-05-08 | End: 2021-04-01

## 2020-05-08 RX ADMIN — IBUPROFEN 800 MG: 800 TABLET ORAL at 09:07

## 2020-05-08 RX ADMIN — ACETAMINOPHEN 1000 MG: 500 TABLET, COATED ORAL at 06:03

## 2020-05-08 RX ADMIN — PRENATAL VITAMINS-IRON FUMARATE 27 MG IRON-FOLIC ACID 0.8 MG TABLET 1 TABLET: at 09:07

## 2020-05-08 NOTE — PLAN OF CARE
Problem: Patient Care Overview  Goal: Plan of Care Review  Outcome: Ongoing (interventions implemented as appropriate)  Flowsheets  Taken 5/7/2020 1700 by Micheal Horan, RN  Progress: improving  Taken 5/8/2020 0404 by Gila Gonzalez, RN  Plan of Care Reviewed With: patient;spouse  Outcome Summary: VSS,adequate pain control bondind with infant, breastfeeding progressing

## 2020-05-08 NOTE — DISCHARGE SUMMARY
Discharge Summary     Otto  Della Garcia  : 1995  MRN: [id number]  CSN: [id number]    Date of Admission: 2020   Date of Discharge:  2020   Delivering Physician: Shaneka Miner        Admission Diagnosis: Pregnancy [Z34.90]   Discharge Diagnosis: 1. Same as above plus  2. Pregnancy at 38w5d - delivered       Procedures: 2020  - Vaginal, Spontaneous       Hospital Course  Patient is a 25 y.o.  who at 38w5d had a vaginal birth.  Her postpartum course was without complications.  On PPD #1 she was ready for discharge.  She had normal lochia and pain well controlled with oral medications. She is breastfeeding    Infant  female  fetus weighing 2778 g (6 lb 2 oz)   Apgars -  8  @ 1 minute /  9  @ 5 minutes.    Discharge labs  Lab Results   Component Value Date    WBC 12.03 (H) 2020    HGB 11.3 (L) 2020    HCT 33.9 (L) 2020     2020       Discharge Medications     Discharge Medications      New Medications      Instructions Start Date   ibuprofen 800 MG tablet  Commonly known as:  ADVIL,MOTRIN   800 mg, Oral, Every 8 Hours PRN         Continue These Medications      Instructions Start Date   prenatal (CLASSIC) vitamin 28-0.8 MG tablet tablet   Oral, Daily             Discharge Disposition Home or Self Care   Condition on Discharge: good   Follow-up: 6 weeks with [facility]     Time spent on discharge: 30 minutes or less  Trini Brown, TRISH  2020

## 2020-06-19 ENCOUNTER — POSTPARTUM VISIT (OUTPATIENT)
Dept: OBSTETRICS AND GYNECOLOGY | Facility: CLINIC | Age: 25
End: 2020-06-19

## 2020-06-19 VITALS
SYSTOLIC BLOOD PRESSURE: 124 MMHG | WEIGHT: 156 LBS | DIASTOLIC BLOOD PRESSURE: 64 MMHG | HEIGHT: 63 IN | BODY MASS INDEX: 27.64 KG/M2

## 2020-06-19 PROCEDURE — 0503F POSTPARTUM CARE VISIT: CPT | Performed by: NURSE PRACTITIONER

## 2020-06-19 RX ORDER — DESOGESTREL AND ETHINYL ESTRADIOL 0.15-0.03
1 KIT ORAL DAILY
Qty: 28 TABLET | Refills: 12 | Status: SHIPPED | OUTPATIENT
Start: 2020-06-19 | End: 2021-06-19

## 2020-06-19 NOTE — PROGRESS NOTES
Shaneka Miner CNM  2020      Subjective   Chief Complaint   Patient presents with   • Postpartum Care     delivered on 20 vaginally by ana Vogel 10/2019, no complaints, bottle feeding, wants to discuss contraception      Della Garcia is a 25 y.o. year old  presenting to be seen for her postpartum visit.  She had a vaginal delivery.    Since delivery she has not been sexually active.  She does not have concerns about post-partum blues/depression.   She is bottle feeding.  For ongoing contraception, her plans are OCP's    The following portions of the patient's history were reviewed and updated as appropriate:current medications and allergies    Social History    Tobacco Use      Smoking status: Never Smoker      Smokeless tobacco: Never Used          Review of Systems    Pertinent items are noted in HPI, all other systems reviewed and negative      Objective     Physical Exam  Constitutional   The patient is awake, well developed & well nourished.   Abdomen   The abdomen is soft and non tender.  No masses   Genitourinary Pelvis:      External genitalia:  normal appearance of the external genitalia   Vaginal:  normal pink mucosa without lesions.  Cervix:  Neg CMT  Uterus:  normal size, shape and consistency.  Adnexa:  normal bimanual exam of the adnexa  Perineum is without inflammation  Musculoskeletal  Negative  Extremities  Full ROM   Psychiatric  The patient is oriented to person, place, and time. Speech is fluent and words are clear.          Assessment   Normal 6 week postpartum exam  Desires OC's      Plan   SBE / awareness  BC options reviewed and desires OC's   Meds prescribed - yes  Follow up 1 yr      New Medications Ordered This Visit   Medications   • desogestrel-ethinyl estradiol (APRI) 0.15-30 MG-MCG per tablet     Sig: Take 1 tablet by mouth Daily.     Dispense:  28 tablet     Refill:  12          This note was electronically signed.  Shaneka Miner  CNM

## 2022-02-24 ENCOUNTER — OFFICE VISIT (OUTPATIENT)
Dept: OBSTETRICS AND GYNECOLOGY | Facility: CLINIC | Age: 27
End: 2022-02-24

## 2022-02-24 VITALS
WEIGHT: 141 LBS | BODY MASS INDEX: 24.98 KG/M2 | SYSTOLIC BLOOD PRESSURE: 112 MMHG | HEIGHT: 63 IN | DIASTOLIC BLOOD PRESSURE: 70 MMHG

## 2022-02-24 DIAGNOSIS — R10.2 PELVIC PRESSURE IN FEMALE: Primary | ICD-10-CM

## 2022-02-24 PROCEDURE — 99213 OFFICE O/P EST LOW 20 MIN: CPT | Performed by: MIDWIFE

## 2022-02-24 NOTE — PROGRESS NOTES
"Chief Complaint   Patient presents with   • pelvic pressure     pelvic pressure since delivery of daughter , states it has gotten worse since then, no discharge. states when she goes to have a bowel movement , feels like something is coming out of her vagina ,       Della Garcia is a 27 y.o. year old  presenting to be seen for pelvic pressure.  She states this has been intermittent since delivery of her baby in .  She states sometimes when she has a bowel movement she feels like something is coming out of her vagina.  She does not actually feel anything there.  She denies dysuria.  She denies constipation.  She has occasional ROSALIE.  She denies any unusual vaginal discharge.  She states when she begins to have sex she has to get up to void because of the pressure.  She is not using any birth control and is considering another pregnancy.    History  Past Medical History:   Diagnosis Date   • Varicella    , Allergies:  Cephalosporins and Albuterol    Social History    Tobacco Use      Smoking status: Never Smoker      Smokeless tobacco: Never Used      Review of Systems  Pertinent items are noted in HPI, all other systems reviewed and negative       Objective   /70   Ht 160 cm (63\")   Wt 64 kg (141 lb)   LMP 2022   BMI 24.98 kg/m²     Physical Exam:  General Appearance: alert, appears stated age and cooperative  Lungs: clear to auscultation, respirations regular, respirations even and respirations unlabored  Heart: regular rhythm and normal rate and normal S1, S2  Abdomen: soft non-tender  Pelvic: External genitalia:  normal appearance of the external genitalia including Bartholin's and [place]'s glands.  Vaginal:  normal pink mucosa without prolapse or lesions.  Cervix:  normal appearance. No evidence of prolapse  Uterus:  normal size, shape and consistency. No evidence of prolapse  Adnexa:  normal bimanual exam of the adnexa.  Extremities: moves extremities well, no edema, no cyanosis and " no redness  Skin: no bleeding, bruising or rash and no lesions noted  Neurologic: Mental Status orientated to person, place, time and situation, Speech normal content and execusion    Lab Review   No data reviewed    Imaging   No data reviewed         Assessment /Plan    Diagnoses and all orders for this visit:    1. Pelvic pressure in female (Primary)  -     NuSwab VG+ - Swab, Cervix  Advised to perform Kegel exercises.  Advised to take prenatal vitamin prior to conception.          Follow up for annual exam           This note was electronically signed.  Trini Brown CNM  2/24/2022

## 2022-02-27 LAB
A VAGINAE DNA VAG QL NAA+PROBE: NORMAL SCORE
BVAB2 DNA VAG QL NAA+PROBE: NORMAL SCORE
C ALBICANS DNA VAG QL NAA+PROBE: NEGATIVE
C GLABRATA DNA VAG QL NAA+PROBE: NEGATIVE
C TRACH DNA VAG QL NAA+PROBE: NEGATIVE
MEGA1 DNA VAG QL NAA+PROBE: NORMAL SCORE
N GONORRHOEA DNA VAG QL NAA+PROBE: NEGATIVE
T VAGINALIS DNA VAG QL NAA+PROBE: NEGATIVE

## 2022-06-08 ENCOUNTER — OFFICE VISIT (OUTPATIENT)
Dept: OBSTETRICS AND GYNECOLOGY | Facility: CLINIC | Age: 27
End: 2022-06-08

## 2022-06-08 VITALS
SYSTOLIC BLOOD PRESSURE: 108 MMHG | WEIGHT: 138 LBS | HEIGHT: 63 IN | DIASTOLIC BLOOD PRESSURE: 60 MMHG | BODY MASS INDEX: 24.45 KG/M2

## 2022-06-08 DIAGNOSIS — Z01.419 ROUTINE GYNECOLOGICAL EXAMINATION: Primary | ICD-10-CM

## 2022-06-08 DIAGNOSIS — Z12.4 SCREENING FOR CERVICAL CANCER: ICD-10-CM

## 2022-06-08 PROCEDURE — 99395 PREV VISIT EST AGE 18-39: CPT | Performed by: PHYSICIAN ASSISTANT

## 2022-06-08 NOTE — PROGRESS NOTES
"Subjective   Chief Complaint   Patient presents with   • Gynecologic Exam     Concerns with something coming out of vagina when she has bowel movement        Della Martin is a 27 y.o. year old  presenting to be seen for her annual gynecological exam.   Patient has no complaints other than she continues to have the sensation that something is coming through the vagina when she has a bowel movement.  She feels a pressure sensation.  She is not actually seeing a discharge or any substance coming out of the vagina.  This is been ongoing for several months and she saw the midwife several months ago with no abnormal findings.  She had a baby 2 years ago with an uncomplicated vaginal delivery.  She is not using any birth control and is wanting to conceive again.  Her LMP was 2022    Past Medical History:   Diagnosis Date   • Varicella       No current outpatient medications on file.   Allergies   Allergen Reactions   • Cephalosporins Rash   • Albuterol Hives      History reviewed. No pertinent surgical history.   Social History     Socioeconomic History   • Marital status:    Tobacco Use   • Smoking status: Never Smoker   • Smokeless tobacco: Never Used   Substance and Sexual Activity   • Alcohol use: No   • Drug use: No   • Sexual activity: Yes     Partners: Male      Family History   Problem Relation Age of Onset   • Diabetes Maternal Aunt    • Diabetes Maternal Uncle        Review of Systems   Constitutional: Negative for chills, diaphoresis and fever.   Gastrointestinal: Positive for constipation.   Genitourinary: Negative for difficulty urinating, dysuria, menstrual problem, pelvic pain and vaginal discharge.           Objective   /60   Ht 160 cm (63\")   Wt 62.6 kg (138 lb)   LMP 2022   BMI 24.45 kg/m²     Physical Exam  Constitutional:       Appearance: Normal appearance. She is well-developed and well-groomed.   Eyes:      General: Lids are normal.      Extraocular Movements: " Extraocular movements intact.      Conjunctiva/sclera: Conjunctivae normal.   Neck:      Thyroid: No thyroid mass or thyromegaly.   Chest:   Breasts: Breasts are symmetrical.      Right: No inverted nipple, mass, nipple discharge, skin change, tenderness or axillary adenopathy.      Left: No inverted nipple, mass, nipple discharge, skin change, tenderness or axillary adenopathy.       Abdominal:      General: There is no distension.      Palpations: Abdomen is soft. There is no hepatomegaly or splenomegaly.      Tenderness: There is no abdominal tenderness.   Genitourinary:     Exam position: Lithotomy position.      Labia:         Right: No rash, tenderness or lesion.         Left: No rash, tenderness or lesion.       Urethra: No prolapse, urethral pain, urethral swelling or urethral lesion.      Vagina: No vaginal discharge, tenderness, lesions or prolapsed vaginal walls.      Cervix: No cervical motion tenderness, discharge, friability or lesion.      Uterus: Not enlarged and not tender.       Adnexa:         Right: No mass or tenderness.          Left: No mass or tenderness.        Rectum: No external hemorrhoid. Normal anal tone.      Comments: Very mild laxity of vaginal vault with cough and straining but no prolapse noted  Good anal sphincter tone  No evidence of rectovaginal fistula on exam  Musculoskeletal:      Cervical back: Neck supple.   Lymphadenopathy:      Upper Body:      Right upper body: No axillary adenopathy.      Left upper body: No axillary adenopathy.   Skin:     General: Skin is warm and dry.      Findings: No lesion.   Neurological:      General: No focal deficit present.      Mental Status: She is alert and oriented to person, place, and time.   Psychiatric:         Attention and Perception: Attention normal.         Mood and Affect: Mood normal.         Speech: Speech normal.         Behavior: Behavior normal.         Thought Content: Thought content normal.            Result Review :                    Assessment and Plan  Diagnoses and all orders for this visit:    1. Routine gynecological examination (Primary)  -     LIQUID-BASED PAP SMEAR, [facility] ([facility])    2. Screening for cervical cancer      Patient Instructions   Self breast exam monthly  Regular exercise    Patient is reassured there are no abnormal findings on her exam.  Encouraged her to continue Kegel exercises that she has been doing sporadically but encouraged regular daily exercises.  Encouraged to start a prenatal vitamin.             This note was electronically signed.    Nguyen Landrum PA-C   June 8, 2022

## 2022-06-08 NOTE — PATIENT INSTRUCTIONS
Self breast exam monthly  Regular exercise    Patient is reassured there are no abnormal findings on her exam.  Encouraged her to continue Kegel exercises that she has been doing sporadically but encouraged regular daily exercises.  Encouraged to start a prenatal vitamin.

## 2022-06-12 LAB — REF LAB TEST METHOD: NORMAL

## 2022-08-08 ENCOUNTER — HOSPITAL ENCOUNTER (EMERGENCY)
Facility: HOSPITAL | Age: 27
Discharge: LEFT BEFORE DISCHARGE | End: 2022-08-08

## 2022-08-08 VITALS
TEMPERATURE: 98 F | WEIGHT: 143 LBS | BODY MASS INDEX: 25.34 KG/M2 | OXYGEN SATURATION: 100 % | SYSTOLIC BLOOD PRESSURE: 115 MMHG | HEIGHT: 63 IN | HEART RATE: 98 BPM | DIASTOLIC BLOOD PRESSURE: 79 MMHG | RESPIRATION RATE: 20 BRPM

## 2022-08-08 PROCEDURE — 99211 OFF/OP EST MAY X REQ PHY/QHP: CPT

## 2022-08-09 ENCOUNTER — HOSPITAL ENCOUNTER (EMERGENCY)
Facility: HOSPITAL | Age: 27
Discharge: HOME | End: 2022-08-09
Attending: EMERGENCY MEDICINE | Admitting: EMERGENCY MEDICINE

## 2022-08-09 ENCOUNTER — APPOINTMENT (OUTPATIENT)
Dept: ULTRASOUND IMAGING | Facility: HOSPITAL | Age: 27
End: 2022-08-09

## 2022-08-09 VITALS
TEMPERATURE: 98.7 F | HEART RATE: 89 BPM | BODY MASS INDEX: 24.45 KG/M2 | WEIGHT: 138 LBS | OXYGEN SATURATION: 100 % | SYSTOLIC BLOOD PRESSURE: 115 MMHG | DIASTOLIC BLOOD PRESSURE: 72 MMHG | HEIGHT: 63 IN | RESPIRATION RATE: 18 BRPM

## 2022-08-09 DIAGNOSIS — O03.9 INEVITABLE COMPLETE MISCARRIAGE WITHOUT COMPLICATION: ICD-10-CM

## 2022-08-09 DIAGNOSIS — R82.71 BACTERIA IN URINE: Primary | ICD-10-CM

## 2022-08-09 LAB
B-HCG UR QL: POSITIVE
BACTERIA UR QL AUTO: ABNORMAL /HPF
BILIRUB UR QL STRIP: NEGATIVE
CLARITY UR: ABNORMAL
COLOR UR: ABNORMAL
GLUCOSE UR STRIP-MCNC: NEGATIVE MG/DL
HCG INTACT+B SERPL-ACNC: 1256 MIU/ML
HGB UR QL STRIP.AUTO: ABNORMAL
HYALINE CASTS UR QL AUTO: ABNORMAL /LPF
KETONES UR QL STRIP: NEGATIVE
LEUKOCYTE ESTERASE UR QL STRIP.AUTO: ABNORMAL
NITRITE UR QL STRIP: NEGATIVE
PH UR STRIP.AUTO: 7.5 [PH] (ref 5–8)
PROT UR QL STRIP: NEGATIVE
RBC # UR STRIP: ABNORMAL /HPF
REF LAB TEST METHOD: ABNORMAL
SP GR UR STRIP: 1.01 (ref 1–1.03)
SQUAMOUS #/AREA URNS HPF: ABNORMAL /HPF
UROBILINOGEN UR QL STRIP: ABNORMAL
WBC # UR STRIP: ABNORMAL /HPF

## 2022-08-09 PROCEDURE — 81025 URINE PREGNANCY TEST: CPT | Performed by: EMERGENCY MEDICINE

## 2022-08-09 PROCEDURE — 99283 EMERGENCY DEPT VISIT LOW MDM: CPT

## 2022-08-09 PROCEDURE — 36415 COLL VENOUS BLD VENIPUNCTURE: CPT

## 2022-08-09 PROCEDURE — 76817 TRANSVAGINAL US OBSTETRIC: CPT

## 2022-08-09 PROCEDURE — 81001 URINALYSIS AUTO W/SCOPE: CPT | Performed by: EMERGENCY MEDICINE

## 2022-08-09 PROCEDURE — 84702 CHORIONIC GONADOTROPIN TEST: CPT | Performed by: PHYSICIAN ASSISTANT

## 2022-08-09 RX ORDER — NITROFURANTOIN 25; 75 MG/1; MG/1
100 CAPSULE ORAL 2 TIMES DAILY
Qty: 14 CAPSULE | Refills: 0 | Status: SHIPPED | OUTPATIENT
Start: 2022-08-09 | End: 2022-08-16

## 2022-08-09 NOTE — ED PROVIDER NOTES
Subjective   Chief Complaint: Vaginal bleeding in pregnancy  History of Present Illness: 27-year-old female comes in for evaluation of above complaint.  G2, P1 6-week pregnant first day of LMP 6/26/2022.  Confirmed positive on home pregnancy test.  Appointment with OB/GYN scheduled for 8/30/2022.  States yesterday had some light pink vaginal spotting that turned to brown and then bright red with clots today.  Cramping lower central abdominal pain.  No urinary symptoms.  No back pain.  No complications with the first pregnancy.  Onset: Yesterday  Timing: Intermittent, worsening  Exacerbating / Alleviating factors: None  Associated symptoms: None      Nurses Notes reviewed and agree, including vitals, allergies, social history and prior medical history.          Review of Systems   Genitourinary: Positive for vaginal bleeding.       Past Medical History:   Diagnosis Date   • Varicella        Allergies   Allergen Reactions   • Cephalosporins Rash   • Albuterol Hives       History reviewed. No pertinent surgical history.    Family History   Problem Relation Age of Onset   • Diabetes Maternal Aunt    • Diabetes Maternal Uncle        Social History     Socioeconomic History   • Marital status:    Tobacco Use   • Smoking status: Never Smoker   • Smokeless tobacco: Never Used   Substance and Sexual Activity   • Alcohol use: No   • Drug use: No   • Sexual activity: Yes     Partners: Male           Objective   Physical Exam  Vitals and nursing note reviewed.   Constitutional:       Appearance: Normal appearance.   HENT:      Head: Normocephalic and atraumatic.      Nose: Nose normal.   Eyes:      Extraocular Movements: Extraocular movements intact.   Cardiovascular:      Rate and Rhythm: Normal rate and regular rhythm.      Pulses: Normal pulses.   Pulmonary:      Effort: Pulmonary effort is normal.   Abdominal:      General: Abdomen is flat.      Palpations: Abdomen is soft.      Tenderness: There is no abdominal  tenderness. There is no guarding.   Musculoskeletal:         General: Normal range of motion.      Cervical back: Normal range of motion.   Skin:     General: Skin is warm.   Neurological:      General: No focal deficit present.      Mental Status: She is alert. Mental status is at baseline.   Psychiatric:         Mood and Affect: Mood normal.         Behavior: Behavior normal.         Procedures           ED Course  ED Course as of 08/09/22 2049 Tue Aug 09, 2022   1817 RBC, UA(!): Too Numerous to Count [TM]   1817 Bacteria, UA(!): 2+ [TM]   1817 Blood, UA(!): Large (3+) [TM]   1820 HCG, Urine QL(!): Positive [TM]   2045 Nitrite, UA: Negative [TM]   2045 Leukocytes, UA(!): Trace [TM]      ED Course User Index  [TM] Wander Yo PA-C                                           [facility]    Final diagnoses:   Bacteria in urine   Inevitable complete miscarriage without complication       ED Disposition  ED Disposition     ED Disposition   Discharge    Condition   Stable    Comment   --             Alisa Harris MD  [address]  [id number]  [place] [id number] [phone]    Schedule an appointment as soon as possible for a visit       [facility] Emergency Department  [address] [address] [phone]    If symptoms worsen or you develop worsening pain, heavier bleeding or fever.         Medication List      New Prescriptions    nitrofurantoin (macrocrystal-monohydrate) 100 MG capsule  Commonly known as: MACROBID  Take 1 capsule by mouth 2 (Two) Times a Day for 7 days.           Where to Get Your Medications      These medications were sent to [facility] [address] [place] [address] - [address] [place] [address] - [phone]  - [phone]   [address] [facility] [address]    Phone: [phone]   · nitrofurantoin (macrocrystal-monohydrate) 100 MG capsule          Wander Yo PA-C  08/09/22 2049

## 2022-08-25 ENCOUNTER — OFFICE VISIT (OUTPATIENT)
Dept: OBSTETRICS AND GYNECOLOGY | Facility: CLINIC | Age: 27
End: 2022-08-25

## 2022-08-25 VITALS
WEIGHT: 139 LBS | BODY MASS INDEX: 24.63 KG/M2 | DIASTOLIC BLOOD PRESSURE: 56 MMHG | HEIGHT: 63 IN | SYSTOLIC BLOOD PRESSURE: 108 MMHG

## 2022-08-25 DIAGNOSIS — O03.9 MISCARRIAGE: Primary | ICD-10-CM

## 2022-08-25 PROCEDURE — 99214 OFFICE O/P EST MOD 30 MIN: CPT | Performed by: OBSTETRICS & GYNECOLOGY

## 2022-08-26 LAB
ERYTHROCYTE [DISTWIDTH] IN BLOOD BY AUTOMATED COUNT: 12.8 % (ref 12.3–15.4)
HCG INTACT+B SERPL-ACNC: 2.18 MIU/ML
HCT VFR BLD AUTO: 37.9 % (ref 34–46.6)
HGB BLD-MCNC: 12.6 G/DL (ref 12–15.9)
MCH RBC QN AUTO: 30.8 PG (ref 26.6–33)
MCHC RBC AUTO-ENTMCNC: 33.2 G/DL (ref 31.5–35.7)
MCV RBC AUTO: 92.7 FL (ref 79–97)
PLATELET # BLD AUTO: 286 10*3/MM3 (ref 140–450)
RBC # BLD AUTO: 4.09 10*6/MM3 (ref 3.77–5.28)
WBC # BLD AUTO: 8.81 10*3/MM3 (ref 3.4–10.8)

## 2022-08-29 NOTE — PROGRESS NOTES
"Chief Complaint  Follow-up (ER follow up, miscarriage on 2022. )     History of Present Illness:  Patient is 27 y.o.  who presents to [facility] here for follow-up evaluation of probable miscarriage.  Patient was seen in the emergency room on .  She reports having heavy bleeding and passing tissue prior to her arrival.  Patient had heavy bleeding in the emergency room as well.  She reports having bleeding for several days thereafter.  She reports the bleeding lasted for 4 days.  She reports marked improvement in her cramping and pain.  This was a planned pregnancy.  Patient has continued on her prenatal vitamins.  She did have labs as noted.  She has not had however a follow-up hCG level.    History  Past Medical History:   Diagnosis Date   • Varicella      No current outpatient medications on file prior to visit.     No current facility-administered medications on file prior to visit.     Allergies   Allergen Reactions   • Cephalosporins Rash   • Albuterol Hives     History reviewed. No pertinent surgical history.  Family History   Problem Relation Age of Onset   • Diabetes Maternal Aunt    • Diabetes Maternal Uncle      Social History     Socioeconomic History   • Marital status:    Tobacco Use   • Smoking status: Never Smoker   • Smokeless tobacco: Never Used   Substance and Sexual Activity   • Alcohol use: No   • Drug use: No   • Sexual activity: Yes     Partners: Male       Physical Examination:  Vital Signs: /56   Ht 160 cm (63\")   Wt 63 kg (139 lb)   BMI 24.62 kg/m²     General Appearance: alert, appears stated age, and cooperative  Breasts: Not performed.  Abdomen: no masses, no hepatomegaly, no splenomegaly, soft non-tender, no guarding, and no rebound tenderness  Pelvic: Not performed.    Data Review:  The following data was reviewed by: Alisa Harris MD on 2022:     Labs:  Pregnancy, Urine - Urine, Clean Catch (2022 18:01)  Urinalysis " With Microscopic If Indicated (No Culture) - Urine, Clean Catch (08/09/2022 18:01)  Urinalysis, Microscopic Only - Urine, Clean Catch (08/09/2022 18:01)  hCG, Quantitative, Pregnancy (08/09/2022 19:37)    Imaging:  US Ob Transvaginal (08/09/2022 19:14) -images reviewed and agree with interpretation    Medical Records:  ED Provider Notes by Wander Yo PA-C (08/09/2022 18:11)      Assessment and Plan   1. Miscarriage  Patient with probable complete miscarriage.  Will obtain labs today as noted.  Patient is to call for her results.  The patient was informed regarding the various types of pregnancy loss or miscarriage.  The patient was informed miscarriage occurs in 10% of all clinically recognized pregnancies.  She was informed 50% of all cases of early pregnancy loss are due to fetal chromosomal abnormalities.  She was informed regarding risk factors for pregnancy loss including maternal age and a history of prior pregnancy loss.   The patient was also informed there are no good data to support delaying conception after an early pregnancy loss to prevent subsequent loss.  She was also informed no work up is generally indicated until after two consecutive early pregnancy losses.  The patient was also informed there are no effective interventions for prevention of early pregnancy loss other than women with antiphospholipid syndrome.  Women who have experienced at least three prior losses may benefit however from progesterone therapy in the first trimester.  - HCG, B-subunit, Quantitative  - CBC (No Diff)    Follow Up/Instructions:  Follow up as noted.  Patient was given instructions and counseling regarding her condition or for health maintenance advice. Please see specific information pulled into the AVS if appropriate.     Note: Speech recognition transcription software may have been used to dictate portions of this document.  An attempt at proofreading has been made though minor errors in  transcription may still be present.    This note was electronically signed.  Alisa Harris M.D.

## 2022-10-26 ENCOUNTER — OFFICE VISIT (OUTPATIENT)
Dept: INTERNAL MEDICINE | Facility: CLINIC | Age: 27
End: 2022-10-26

## 2022-10-26 VITALS
WEIGHT: 142 LBS | TEMPERATURE: 98.4 F | HEIGHT: 63 IN | HEART RATE: 93 BPM | BODY MASS INDEX: 25.16 KG/M2 | OXYGEN SATURATION: 98 % | SYSTOLIC BLOOD PRESSURE: 110 MMHG | DIASTOLIC BLOOD PRESSURE: 78 MMHG

## 2022-10-26 DIAGNOSIS — R41.840 DIFFICULTY CONCENTRATING: ICD-10-CM

## 2022-10-26 DIAGNOSIS — Z76.89 ENCOUNTER TO ESTABLISH CARE: Primary | ICD-10-CM

## 2022-10-26 DIAGNOSIS — T14.90XA TRAUMA IN CHILDHOOD: ICD-10-CM

## 2022-10-26 DIAGNOSIS — F41.1 GENERALIZED ANXIETY DISORDER: ICD-10-CM

## 2022-10-26 PROBLEM — Z62.819 TRAUMA IN CHILDHOOD: Status: ACTIVE | Noted: 2022-10-26

## 2022-10-26 PROCEDURE — 99215 OFFICE O/P EST HI 40 MIN: CPT | Performed by: NURSE PRACTITIONER

## 2022-10-26 RX ORDER — VENLAFAXINE HYDROCHLORIDE 37.5 MG/1
37.5 CAPSULE, EXTENDED RELEASE ORAL DAILY
Qty: 30 CAPSULE | Refills: 1 | Status: SHIPPED | OUTPATIENT
Start: 2022-10-26 | End: 2023-03-09

## 2022-10-26 NOTE — PROGRESS NOTES
Office Visit      Patient Name: Della Martin  : 1995   MRN: [id number]   Care Team: Patient Care Team:  Julisa Elena APRN as PCP - General (Family Medicine)    Chief Complaint  Establish Care (Referral for behavioral health- ADHD evaluation ) and Anxiety (PHQ-9: 17/GAG-7: 14)    Subjective     Subjective      Della Martin presents to [facility] to establish care with me and behavioral health concerns.  From [facility] and has not seen a primary care physician in many years.  She does have a 2-year-old daughter and most of her care has been through her OB/GYN since then.  She has no significant medical history.  She does have acute concerns for mental health today.  She has had the symptoms for most of her life.  She tells me she had a traumatic childhood and was not well cared for from by her mother.  Mother and grandfather did suffer from alcoholism and likely other mental health disorders, unsure if they were ever formally diagnosed.  She does not know much about her dad side of the family.  She complains of feeling overwhelmed, having racing thoughts, feeling overly worried about things she cannot control, at times feeling down/depressed/hopeless, and extreme difficulty focusing.  Feels like she cannot focus due to everything going on around her.  She has not had any recent lab work.  In 2017 PCP placed her on bupropion and buspirone, she did not tolerate these medications.  Her anxiety was much worse on the medication and she started experiencing suicidal ideations therefore stopped the medication after 1 month.  She has never been in any sort of counseling.  She tells me she did struggle in school as a child and had difficulty focusing then as well, however she did have a lot going on at home at that time.  Lives with her  and he is very supportive of her.  She adamantly denies any thoughts of suicide and has never participated in any  self-harm.  PHQ-9 Depression Screening  Little interest or pleasure in doing things? 1-->several days   Feeling down, depressed, or hopeless? 1-->several days   Trouble falling or staying asleep, or sleeping too much? 2-->more than half the days   Feeling tired or having little energy? 3-->nearly every day   Poor appetite or overeating? 2-->more than half the days   Feeling bad about yourself - or that you are a failure or have let yourself or your family down? 2-->more than half the days   Trouble concentrating on things, such as reading the newspaper or watching television? 3-->nearly every day   Moving or speaking so slowly that other people could have noticed? Or the opposite - being so fidgety or restless that you have been moving around a lot more than usual? 3-->nearly every day   Thoughts that you would be better off dead, or of hurting yourself in some way? 0-->not at all   PHQ-9 Total Score 17   If you checked off any problems, how difficult have these problems made it for you to do your work, take care of things at home, or get along with other people? extremely difficult       YURI 7= 14  Review of Systems   Constitutional: Positive for fatigue. Negative for appetite change and fever.   HENT: Negative for congestion, sore throat and trouble swallowing.    Eyes: Negative for blurred vision and visual disturbance.   Respiratory: Negative for cough, shortness of breath and wheezing.    Cardiovascular: Negative for chest pain, palpitations and leg swelling.   Gastrointestinal: Negative for abdominal pain, blood in stool, constipation, diarrhea and nausea.   Endocrine: Negative for polydipsia, polyphagia and polyuria.   Genitourinary: Negative for dysuria.   Musculoskeletal: Negative for arthralgias, back pain and myalgias.   Skin: Negative for rash.   Neurological: Negative for dizziness, weakness, light-headedness and headache.   Psychiatric/Behavioral: Positive for decreased concentration and depressed  "mood. Negative for sleep disturbance, suicidal ideas and negative for hyperactivity. The patient is nervous/anxious.        Objective     Objective   Vital Signs:   /78   Pulse 93   Temp 98.4 °F (36.9 °C)   Ht 160 cm (63\")   Wt 64.4 kg (142 lb)   SpO2 98%   BMI 25.15 kg/m²     Physical Exam  Vitals and nursing note reviewed.   Constitutional:       General: She is not in acute distress.     Appearance: Normal appearance. She is not toxic-appearing.   Eyes:      Pupils: Pupils are equal, round, and reactive to light.   Neck:      Vascular: No carotid bruit.   Cardiovascular:      Rate and Rhythm: Normal rate and regular rhythm.      Heart sounds: Normal heart sounds. No murmur heard.  Pulmonary:      Effort: Pulmonary effort is normal. No respiratory distress.      Breath sounds: Normal breath sounds. No wheezing.   Musculoskeletal:      Cervical back: Neck supple. No tenderness.   Skin:     General: Skin is warm and dry.      Findings: No rash.   Neurological:      Mental Status: She is alert.   Psychiatric:         Mood and Affect: Mood normal.         Behavior: Behavior normal.          Assessment / Plan      Assessment & Plan   Problem List Items Addressed This Visit    None  Visit Diagnoses     Encounter to establish care    -  Primary    Update labs today, will return in 1 month and perform annual physical at that time.  Preventative maintenance will be updated at that time.    Generalized anxiety disorder        Relevant Medications    venlafaxine XR (Effexor XR) 37.5 MG 24 hr capsule    Other Relevant Orders    CBC No Differential    Comprehensive metabolic panel    TSH Rfx On Abnormal To Free T4    Vitamin B12    Ambulatory Referral to Behavioral Health    Upon initial evaluation I suspect symptoms are more so related to underlying anxiety/PTSD from childhood trauma.  I strongly encouraged her to go to counseling and have placed a referral today.  She would still like to establish with behavioral " [facility] for formal ADHD evaluation and I have also placed a referral for our nurse practitioner in office today.  Recommend initiation of low-dose venlafaxine, I have advised her on side effects of this medication including the risk of suicidal ideations.  She understands to stop the medication immediately and go to the ER should she develop this.  We also discussed lifestyle changes including sleep hygiene, healthy diet, exercise, journaling, daily sun exposure, limit caffeine, and talking with support system.  She will have close follow-up with me in 1 month on medication, will let me know sooner if she has any issues.  Update labs today.    Difficulty concentrating        Relevant Orders    CBC No Differential    Comprehensive metabolic panel    TSH Rfx On Abnormal To Free T4    Vitamin B12    Ambulatory Referral to Behavioral Health    Ambulatory Referral to Behavioral Health  Referral to behavioral health for formal ADHD evaluation.        I spent 42 minutes caring for Della on this date of service. This time includes time spent by me in the following activities:preparing for the visit, obtaining and/or reviewing a separately obtained history, performing a medically appropriate examination and/or evaluation , counseling and educating the patient/family/caregiver, ordering medications, tests, or procedures and documenting information in the medical record       Follow Up   Return in about 4 weeks (around 11/23/2022) for Annual.  Patient was given instructions and counseling regarding her condition or for health maintenance advice. Please see specific information pulled into the AVS if appropriate.     TRISH Piper  [facility]

## 2022-11-22 ENCOUNTER — TELEPHONE (OUTPATIENT)
Dept: OBSTETRICS AND GYNECOLOGY | Facility: CLINIC | Age: 27
End: 2022-11-22

## 2022-11-22 RX ORDER — FLUCONAZOLE 150 MG/1
150 TABLET ORAL EVERY OTHER DAY
Qty: 2 TABLET | Refills: 0 | Status: SHIPPED | OUTPATIENT
Start: 2022-11-22 | End: 2022-11-25

## 2022-11-22 NOTE — TELEPHONE ENCOUNTER
Caller: MarioDella    Relationship: Self    Best call back number: [phone]    What medication are you requesting:YEAST INFECTION MED    What are your current symptoms: ITCHY AND WHITE CREAMY DISCHARGE    How long have you been experiencing symptoms: A WEEK    Have you had these symptoms before:    [x] Yes  [] No    Have you been treated for these symptoms before:   [x] Yes  [] No    If a prescription is needed, what is your preferred pharmacy and phone number:  [address] - [address] [place] [address] [phone] [facility] [phone]   [phone]

## 2023-02-06 ENCOUNTER — TELEPHONE (OUTPATIENT)
Dept: OBSTETRICS AND GYNECOLOGY | Facility: CLINIC | Age: 28
End: 2023-02-06
Payer: COMMERCIAL

## 2023-02-06 NOTE — TELEPHONE ENCOUNTER
Caller: BRENT CID    Relationship to patient: SELF    Best call back number: [phone]  OK TO [facility]    Chief complaint: PT HAS HISTORY OF MISCARRIAGES  HUB FIRST AVL FOR US/NEW OB IS 3/23/2023    PT IS REQUESTING TO BE SEEN CLOSER TO 8 WKS RATHER THAN 10 WKS    Type of visit: NEW OB    Requested date: TBD

## 2023-03-09 ENCOUNTER — INITIAL PRENATAL (OUTPATIENT)
Dept: OBSTETRICS AND GYNECOLOGY | Facility: CLINIC | Age: 28
End: 2023-03-09
Payer: COMMERCIAL

## 2023-03-09 VITALS — SYSTOLIC BLOOD PRESSURE: 110 MMHG | BODY MASS INDEX: 24.27 KG/M2 | DIASTOLIC BLOOD PRESSURE: 74 MMHG | WEIGHT: 137 LBS

## 2023-03-09 DIAGNOSIS — Z34.91 PRENATAL CARE IN FIRST TRIMESTER: Primary | ICD-10-CM

## 2023-03-09 DIAGNOSIS — O21.9 NAUSEA AND VOMITING DURING PREGNANCY: ICD-10-CM

## 2023-03-09 PROCEDURE — 0501F PRENATAL FLOW SHEET: CPT | Performed by: OBSTETRICS & GYNECOLOGY

## 2023-03-09 RX ORDER — METOCLOPRAMIDE 10 MG/1
10 TABLET ORAL
Qty: 45 TABLET | Refills: 5 | Status: SHIPPED | OUTPATIENT
Start: 2023-03-09

## 2023-03-09 RX ORDER — DIPHENHYDRAMINE HYDROCHLORIDE 25 MG/1
25 CAPSULE ORAL NIGHTLY
Qty: 30 TABLET | Refills: 5 | Status: SHIPPED | OUTPATIENT
Start: 2023-03-09

## 2023-03-11 LAB
A VAGINAE DNA VAG QL NAA+PROBE: NORMAL SCORE
ABO GROUP BLD: ABNORMAL
AMPHETAMINES UR QL SCN: NEGATIVE NG/ML
BARBITURATES UR QL SCN: NEGATIVE NG/ML
BASOPHILS # BLD AUTO: 0.1 X10E3/UL (ref 0–0.2)
BASOPHILS NFR BLD AUTO: 1 %
BENZODIAZ UR QL SCN: NEGATIVE NG/ML
BLD GP AB SCN SERPL QL: NEGATIVE
BVAB2 DNA VAG QL NAA+PROBE: NORMAL SCORE
BZE UR QL SCN: NEGATIVE NG/ML
C ALBICANS DNA VAG QL NAA+PROBE: NEGATIVE
C GLABRATA DNA VAG QL NAA+PROBE: NEGATIVE
C TRACH DNA VAG QL NAA+PROBE: NEGATIVE
CANNABINOIDS UR QL SCN: NEGATIVE NG/ML
CREAT UR-MCNC: 82.9 MG/DL (ref 20–300)
EOSINOPHIL # BLD AUTO: 0.1 X10E3/UL (ref 0–0.4)
EOSINOPHIL NFR BLD AUTO: 1 %
ERYTHROCYTE [DISTWIDTH] IN BLOOD BY AUTOMATED COUNT: 12.9 % (ref 11.7–15.4)
HBV SURFACE AG SERPL QL IA: NEGATIVE
HCT VFR BLD AUTO: 36.9 % (ref 34–46.6)
HCV IGG SERPL QL IA: NON REACTIVE
HGB BLD-MCNC: 12 G/DL (ref 11.1–15.9)
HIV 1+2 AB+HIV1 P24 AG SERPL QL IA: NON REACTIVE
IMM GRANULOCYTES # BLD AUTO: 0 X10E3/UL (ref 0–0.1)
IMM GRANULOCYTES NFR BLD AUTO: 0 %
LABORATORY COMMENT REPORT: NORMAL
LYMPHOCYTES # BLD AUTO: 2.4 X10E3/UL (ref 0.7–3.1)
LYMPHOCYTES NFR BLD AUTO: 23 %
MCH RBC QN AUTO: 30.4 PG (ref 26.6–33)
MCHC RBC AUTO-ENTMCNC: 32.5 G/DL (ref 31.5–35.7)
MCV RBC AUTO: 93 FL (ref 79–97)
MEGA1 DNA VAG QL NAA+PROBE: NORMAL SCORE
METHADONE UR QL SCN: NEGATIVE NG/ML
MONOCYTES # BLD AUTO: 0.4 X10E3/UL (ref 0.1–0.9)
MONOCYTES NFR BLD AUTO: 4 %
N GONORRHOEA DNA VAG QL NAA+PROBE: NEGATIVE
NEUTROPHILS # BLD AUTO: 7.4 X10E3/UL (ref 1.4–7)
NEUTROPHILS NFR BLD AUTO: 71 %
OPIATES UR QL SCN: NEGATIVE NG/ML
OXYCODONE+OXYMORPHONE UR QL SCN: NEGATIVE NG/ML
PCP UR QL: NEGATIVE NG/ML
PH UR: 8.1 [PH] (ref 4.5–8.9)
PLATELET # BLD AUTO: 279 X10E3/UL (ref 150–450)
PROPOXYPH UR QL SCN: NEGATIVE NG/ML
RBC # BLD AUTO: 3.95 X10E6/UL (ref 3.77–5.28)
RH BLD: POSITIVE
RPR SER QL: NON REACTIVE
RUBV IGG SERPL IA-ACNC: 1.72 INDEX
T VAGINALIS DNA VAG QL NAA+PROBE: NEGATIVE
WBC # BLD AUTO: 10.3 X10E3/UL (ref 3.4–10.8)

## 2023-03-13 ENCOUNTER — TELEPHONE (OUTPATIENT)
Dept: OBSTETRICS AND GYNECOLOGY | Facility: CLINIC | Age: 28
End: 2023-03-13

## 2023-03-13 NOTE — PROGRESS NOTES
New Pregnancy Visit    Subjective   Chief Complaint   Patient presents with   • Initial Prenatal Visit     C/o vomiting, requesting ly       Della Martin is a 28 y.o. year old .  Patient's last menstrual period was 2022.  She presents to initiate prenatal care with our group today.     Worsening nausea and emesis.  Previous term vaginal birth in .  Pelvis proven to 6 pounds 5 ounces.  SAB x 1.    Social History    Tobacco Use      Smoking status: Never      Smokeless tobacco: Never      No current outpatient medications on file prior to visit.     No current facility-administered medications on file prior to visit.          Objective   /74   Wt 62.1 kg (137 lb)   LMP 2022   BMI 24.27 kg/m²   Physical Exam:  Normal, gestational age-appropriate exam today        Medical Decision Making:    Lab Review:   No data reviewed    Note Review:  None    Imaging Review:  Pelvic ultrasound report   IUP measuring 8+0 weeks with appropriate fetal cardiac activity.  MINH is set at 10/17/2023 (sure LMP).  The cervix and bilateral ovaries are normal in appearance.  There is a small, roughly 1.7 cm collapsing cyst noted on the left ovary which may reflect a corpus luteum.  No free fluid in the cul-de-sac.  Assessment   1. IUP at 8+2 weeks  2. Supervision of low risk pregnancy   3. Nausea and emesis     Plan    1. The problem list for pregnancy was initiated today  2. Tests/Orders/Rx for today:  Orders Placed This Encounter   Procedures   • NuSwab VG+ - Swab, Vagina     Order Specific Question:   Release to patient     Answer:   Routine Release   • Urine Drug Screen - Urine, Clean Catch     Order Specific Question:   Release to patient     Answer:   Routine Release   • OB Panel With HIV     Order Specific Question:   Release to patient     Answer:   Routine Release   • OB Panel With HIV       Medication Management: B6/Unisom/Reglan    3. Testing for GC / Chlamydia / trichomonas was done  today  4. Genetic testing reviewed: she will consider the information and make a decision at a later date.  5. Information reviewed: exercise in pregnancy, nutrition in pregnancy, weight gain in pregnancy, work and travel restrictions during pregnancy, list of OTC medications acceptable in pregnancy and call coverage groups    Follow up: 4 week(s)    Darin Alexis MD  Obstetrics and Gynecology  [facility]

## 2023-03-13 NOTE — TELEPHONE ENCOUNTER
Caller: Della Martin    Relationship: Self    Best call back number: [phone]     What is the best time to reach you: ANYTIME    What was the call regarding: PT WAS RETURNING CALL ABOUT HER RESULTS. UNABLE TO REACH OFFICE.    Do you require a callback: YES.

## 2023-03-22 DIAGNOSIS — Z13.79 GENETIC SCREENING: Primary | ICD-10-CM

## 2023-03-26 LAB
CFDNA.FET/CFDNA.TOTAL SFR FETUS: NORMAL %
CITATION REF LAB TEST: NORMAL
FET 13+18+21+X+Y ANEUP PLAS.CFDNA: NEGATIVE
FET CHR 21 TS PLAS.CFDNA QL: NEGATIVE
FET MS X RISK WBC.DNA+CFDNA QL: NOT DETECTED
FET SEX PLAS.CFDNA DOSAGE CFDNA: NORMAL
FET TS 13 RISK PLAS.CFDNA QL: NEGATIVE
FET TS 18 RISK WBC.DNA+CFDNA QL: NEGATIVE
FET X + Y ANEUP RISK PLAS.CFDNA SEQ-IMP: NOT DETECTED
GA EST FROM CONCEPTION DATE: NORMAL D
GESTATIONAL AGE > 9:: YES
LAB DIRECTOR NAME PROVIDER: NORMAL
LAB DIRECTOR NAME PROVIDER: NORMAL
LABORATORY COMMENT REPORT: NORMAL
LIMITATIONS OF THE TEST: NORMAL
NEGATIVE PREDICTIVE VALUE: NORMAL
NOTE: NORMAL
PERFORMANCE CHARACTERISTICS: NORMAL
POSITIVE PREDICTIVE VALUE: NORMAL
REF LAB TEST METHOD: NORMAL
TEST PERFORMANCE INFO SPEC: NORMAL

## 2023-04-06 ENCOUNTER — ROUTINE PRENATAL (OUTPATIENT)
Dept: OBSTETRICS AND GYNECOLOGY | Facility: CLINIC | Age: 28
End: 2023-04-06
Payer: COMMERCIAL

## 2023-04-06 VITALS — BODY MASS INDEX: 23.91 KG/M2 | DIASTOLIC BLOOD PRESSURE: 78 MMHG | WEIGHT: 135 LBS | SYSTOLIC BLOOD PRESSURE: 118 MMHG

## 2023-04-06 DIAGNOSIS — Z34.81 ENCOUNTER FOR SUPERVISION OF OTHER NORMAL PREGNANCY IN FIRST TRIMESTER: Primary | ICD-10-CM

## 2023-04-06 PROCEDURE — 0502F SUBSEQUENT PRENATAL CARE: CPT | Performed by: STUDENT IN AN ORGANIZED HEALTH CARE EDUCATION/TRAINING PROGRAM

## 2023-04-21 ENCOUNTER — HOSPITAL ENCOUNTER (EMERGENCY)
Facility: HOSPITAL | Age: 28
Discharge: LEFT BEFORE DISCHARGE | End: 2023-04-21
Payer: COMMERCIAL

## 2023-04-21 VITALS
TEMPERATURE: 98.4 F | OXYGEN SATURATION: 100 % | SYSTOLIC BLOOD PRESSURE: 113 MMHG | WEIGHT: 137 LBS | HEIGHT: 63 IN | BODY MASS INDEX: 24.27 KG/M2 | DIASTOLIC BLOOD PRESSURE: 54 MMHG | HEART RATE: 79 BPM | RESPIRATION RATE: 18 BRPM

## 2023-04-21 LAB
ALBUMIN SERPL-MCNC: 3.7 G/DL (ref 3.5–5.2)
ALBUMIN/GLOB SERPL: 1.4 G/DL
ALP SERPL-CCNC: 42 U/L (ref 39–117)
ALT SERPL W P-5'-P-CCNC: 9 U/L (ref 1–33)
ANION GAP SERPL CALCULATED.3IONS-SCNC: 6.9 MMOL/L (ref 5–15)
AST SERPL-CCNC: 11 U/L (ref 1–32)
BASOPHILS # BLD AUTO: 0.03 10*3/MM3 (ref 0–0.2)
BASOPHILS NFR BLD AUTO: 0.3 % (ref 0–1.5)
BILIRUB SERPL-MCNC: <0.2 MG/DL (ref 0–1.2)
BILIRUB UR QL STRIP: NEGATIVE
BUN SERPL-MCNC: 8 MG/DL (ref 6–20)
BUN/CREAT SERPL: 13.3 (ref 7–25)
CALCIUM SPEC-SCNC: 8.7 MG/DL (ref 8.6–10.5)
CHLORIDE SERPL-SCNC: 106 MMOL/L (ref 98–107)
CLARITY UR: ABNORMAL
CO2 SERPL-SCNC: 24.1 MMOL/L (ref 22–29)
COLOR UR: YELLOW
CREAT SERPL-MCNC: 0.6 MG/DL (ref 0.57–1)
DEPRECATED RDW RBC AUTO: 43.1 FL (ref 37–54)
EGFRCR SERPLBLD CKD-EPI 2021: 127.1 ML/MIN/1.73
EOSINOPHIL # BLD AUTO: 0.06 10*3/MM3 (ref 0–0.4)
EOSINOPHIL NFR BLD AUTO: 0.6 % (ref 0.3–6.2)
ERYTHROCYTE [DISTWIDTH] IN BLOOD BY AUTOMATED COUNT: 13.2 % (ref 12.3–15.4)
GLOBULIN UR ELPH-MCNC: 2.7 GM/DL
GLUCOSE SERPL-MCNC: 82 MG/DL (ref 65–99)
GLUCOSE UR STRIP-MCNC: NEGATIVE MG/DL
HCG INTACT+B SERPL-ACNC: NORMAL MIU/ML
HCT VFR BLD AUTO: 32.8 % (ref 34–46.6)
HGB BLD-MCNC: 11.4 G/DL (ref 12–15.9)
HGB UR QL STRIP.AUTO: NEGATIVE
IMM GRANULOCYTES # BLD AUTO: 0.02 10*3/MM3 (ref 0–0.05)
IMM GRANULOCYTES NFR BLD AUTO: 0.2 % (ref 0–0.5)
KETONES UR QL STRIP: NEGATIVE
LEUKOCYTE ESTERASE UR QL STRIP.AUTO: NEGATIVE
LYMPHOCYTES # BLD AUTO: 2.32 10*3/MM3 (ref 0.7–3.1)
LYMPHOCYTES NFR BLD AUTO: 24 % (ref 19.6–45.3)
MCH RBC QN AUTO: 31 PG (ref 26.6–33)
MCHC RBC AUTO-ENTMCNC: 34.8 G/DL (ref 31.5–35.7)
MCV RBC AUTO: 89.1 FL (ref 79–97)
MONOCYTES # BLD AUTO: 0.41 10*3/MM3 (ref 0.1–0.9)
MONOCYTES NFR BLD AUTO: 4.2 % (ref 5–12)
NEUTROPHILS NFR BLD AUTO: 6.84 10*3/MM3 (ref 1.7–7)
NEUTROPHILS NFR BLD AUTO: 70.7 % (ref 42.7–76)
NITRITE UR QL STRIP: NEGATIVE
NRBC BLD AUTO-RTO: 0 /100 WBC (ref 0–0.2)
PH UR STRIP.AUTO: 6.5 [PH] (ref 5–8)
PLATELET # BLD AUTO: 229 10*3/MM3 (ref 140–450)
PMV BLD AUTO: 10.3 FL (ref 6–12)
POTASSIUM SERPL-SCNC: 3.7 MMOL/L (ref 3.5–5.2)
PROT SERPL-MCNC: 6.4 G/DL (ref 6–8.5)
PROT UR QL STRIP: NEGATIVE
RBC # BLD AUTO: 3.68 10*6/MM3 (ref 3.77–5.28)
SODIUM SERPL-SCNC: 137 MMOL/L (ref 136–145)
SP GR UR STRIP: 1.01 (ref 1–1.03)
UROBILINOGEN UR QL STRIP: ABNORMAL
WBC NRBC COR # BLD: 9.68 10*3/MM3 (ref 3.4–10.8)

## 2023-04-21 PROCEDURE — 80053 COMPREHEN METABOLIC PANEL: CPT

## 2023-04-21 PROCEDURE — 84702 CHORIONIC GONADOTROPIN TEST: CPT

## 2023-04-21 PROCEDURE — 85025 COMPLETE CBC W/AUTO DIFF WBC: CPT

## 2023-04-21 PROCEDURE — 81003 URINALYSIS AUTO W/O SCOPE: CPT

## 2023-04-21 PROCEDURE — 99211 OFF/OP EST MAY X REQ PHY/QHP: CPT

## 2023-04-25 ENCOUNTER — TELEPHONE (OUTPATIENT)
Dept: OBSTETRICS AND GYNECOLOGY | Facility: CLINIC | Age: 28
End: 2023-04-25
Payer: COMMERCIAL

## 2023-04-25 NOTE — TELEPHONE ENCOUNTER
I reviewed HCG which was really good. Keep appt for next week. Pelvic rest. Heavy bleeding like a period, come to ED

## 2023-04-25 NOTE — TELEPHONE ENCOUNTER
----- Message from Della Martin sent at 4/25/2023 11:39 AM EDT -----  Regarding: Brown discharge  Contact: [phone]  Hello. I have continued to have the brown discharge with light cramping and it is concerning me. I came to the ER last Friday after being told to do so by someone at the office when I called in concerned. I was unable to stay because I had to pick my daughter up from . I did have blood work & gave a urine sample which seemed to come back normal. I never had this my first pregnancy so I’m concerned something is wrong.

## 2023-04-26 ENCOUNTER — HOSPITAL ENCOUNTER (EMERGENCY)
Facility: HOSPITAL | Age: 28
Discharge: HOME | End: 2023-04-26
Attending: STUDENT IN AN ORGANIZED HEALTH CARE EDUCATION/TRAINING PROGRAM
Payer: COMMERCIAL

## 2023-04-26 ENCOUNTER — APPOINTMENT (OUTPATIENT)
Dept: ULTRASOUND IMAGING | Facility: HOSPITAL | Age: 28
End: 2023-04-26
Payer: COMMERCIAL

## 2023-04-26 VITALS
WEIGHT: 135 LBS | RESPIRATION RATE: 16 BRPM | DIASTOLIC BLOOD PRESSURE: 64 MMHG | OXYGEN SATURATION: 98 % | SYSTOLIC BLOOD PRESSURE: 110 MMHG | BODY MASS INDEX: 23.92 KG/M2 | HEART RATE: 70 BPM | HEIGHT: 63 IN | TEMPERATURE: 98.7 F

## 2023-04-26 DIAGNOSIS — O20.0 THREATENED MISCARRIAGE: Primary | ICD-10-CM

## 2023-04-26 LAB
ABO GROUP BLD: NORMAL
ALBUMIN SERPL-MCNC: 4 G/DL (ref 3.5–5.2)
ALBUMIN/GLOB SERPL: 1.4 G/DL
ALP SERPL-CCNC: 44 U/L (ref 39–117)
ALT SERPL W P-5'-P-CCNC: 10 U/L (ref 1–33)
ANION GAP SERPL CALCULATED.3IONS-SCNC: 10.2 MMOL/L (ref 5–15)
AST SERPL-CCNC: 14 U/L (ref 1–32)
B-HCG UR QL: POSITIVE
BASOPHILS # BLD AUTO: 0.04 10*3/MM3 (ref 0–0.2)
BASOPHILS NFR BLD AUTO: 0.4 % (ref 0–1.5)
BILIRUB SERPL-MCNC: 0.2 MG/DL (ref 0–1.2)
BILIRUB UR QL STRIP: NEGATIVE
BUN SERPL-MCNC: 8 MG/DL (ref 6–20)
BUN/CREAT SERPL: 17.8 (ref 7–25)
CALCIUM SPEC-SCNC: 9 MG/DL (ref 8.6–10.5)
CHLORIDE SERPL-SCNC: 102 MMOL/L (ref 98–107)
CLARITY UR: ABNORMAL
CO2 SERPL-SCNC: 23.8 MMOL/L (ref 22–29)
COLOR UR: YELLOW
CREAT SERPL-MCNC: 0.45 MG/DL (ref 0.57–1)
DEPRECATED RDW RBC AUTO: 43.8 FL (ref 37–54)
EGFRCR SERPLBLD CKD-EPI 2021: 136.3 ML/MIN/1.73
EOSINOPHIL # BLD AUTO: 0.11 10*3/MM3 (ref 0–0.4)
EOSINOPHIL NFR BLD AUTO: 1 % (ref 0.3–6.2)
ERYTHROCYTE [DISTWIDTH] IN BLOOD BY AUTOMATED COUNT: 13.2 % (ref 12.3–15.4)
GLOBULIN UR ELPH-MCNC: 2.9 GM/DL
GLUCOSE SERPL-MCNC: 99 MG/DL (ref 65–99)
GLUCOSE UR STRIP-MCNC: NEGATIVE MG/DL
HCG INTACT+B SERPL-ACNC: NORMAL MIU/ML
HCT VFR BLD AUTO: 34.1 % (ref 34–46.6)
HGB BLD-MCNC: 12 G/DL (ref 12–15.9)
HGB UR QL STRIP.AUTO: NEGATIVE
IMM GRANULOCYTES # BLD AUTO: 0.04 10*3/MM3 (ref 0–0.05)
IMM GRANULOCYTES NFR BLD AUTO: 0.4 % (ref 0–0.5)
KETONES UR QL STRIP: NEGATIVE
LEUKOCYTE ESTERASE UR QL STRIP.AUTO: NEGATIVE
LYMPHOCYTES # BLD AUTO: 2.81 10*3/MM3 (ref 0.7–3.1)
LYMPHOCYTES NFR BLD AUTO: 25.6 % (ref 19.6–45.3)
MCH RBC QN AUTO: 31.6 PG (ref 26.6–33)
MCHC RBC AUTO-ENTMCNC: 35.2 G/DL (ref 31.5–35.7)
MCV RBC AUTO: 89.7 FL (ref 79–97)
MONOCYTES # BLD AUTO: 0.49 10*3/MM3 (ref 0.1–0.9)
MONOCYTES NFR BLD AUTO: 4.5 % (ref 5–12)
NEUTROPHILS NFR BLD AUTO: 68.1 % (ref 42.7–76)
NEUTROPHILS NFR BLD AUTO: 7.48 10*3/MM3 (ref 1.7–7)
NITRITE UR QL STRIP: NEGATIVE
NRBC BLD AUTO-RTO: 0 /100 WBC (ref 0–0.2)
PH UR STRIP.AUTO: 7 [PH] (ref 5–8)
PLATELET # BLD AUTO: 210 10*3/MM3 (ref 140–450)
PMV BLD AUTO: 10.4 FL (ref 6–12)
POTASSIUM SERPL-SCNC: 3.6 MMOL/L (ref 3.5–5.2)
PROT SERPL-MCNC: 6.9 G/DL (ref 6–8.5)
PROT UR QL STRIP: NEGATIVE
RBC # BLD AUTO: 3.8 10*6/MM3 (ref 3.77–5.28)
RH BLD: POSITIVE
SODIUM SERPL-SCNC: 136 MMOL/L (ref 136–145)
SP GR UR STRIP: 1.01 (ref 1–1.03)
UROBILINOGEN UR QL STRIP: ABNORMAL
WBC NRBC COR # BLD: 10.97 10*3/MM3 (ref 3.4–10.8)

## 2023-04-26 PROCEDURE — 80053 COMPREHEN METABOLIC PANEL: CPT | Performed by: PHYSICIAN ASSISTANT

## 2023-04-26 PROCEDURE — 99283 EMERGENCY DEPT VISIT LOW MDM: CPT

## 2023-04-26 PROCEDURE — 85025 COMPLETE CBC W/AUTO DIFF WBC: CPT | Performed by: PHYSICIAN ASSISTANT

## 2023-04-26 PROCEDURE — 81003 URINALYSIS AUTO W/O SCOPE: CPT | Performed by: PHYSICIAN ASSISTANT

## 2023-04-26 PROCEDURE — 76805 OB US >/= 14 WKS SNGL FETUS: CPT

## 2023-04-26 PROCEDURE — 86901 BLOOD TYPING SEROLOGIC RH(D): CPT | Performed by: PHYSICIAN ASSISTANT

## 2023-04-26 PROCEDURE — 84702 CHORIONIC GONADOTROPIN TEST: CPT | Performed by: PHYSICIAN ASSISTANT

## 2023-04-26 PROCEDURE — 81025 URINE PREGNANCY TEST: CPT | Performed by: PHYSICIAN ASSISTANT

## 2023-04-26 PROCEDURE — 86900 BLOOD TYPING SEROLOGIC ABO: CPT | Performed by: PHYSICIAN ASSISTANT

## 2023-04-26 NOTE — ED PROVIDER NOTES
Subjective  History of Present Illness:    Chief Complaint: Vaginal bleeding  History of Present Illness: Patient is a 28-year-old female presenting to the emergency department for evaluation of vaginal bleeding. She is currently approximately 15 weeks pregnant, and states that she suffered a miscarriage last August.  She indicates that she began bleeding last Wednesday and presented to the emergency department on Friday for evaluation, but had to leave before being seen by provider.  Patient states that bleeding began as spotting with dark blood but has since progressed in volume and changed to a more red color.  She is not currently bleeding enough to fill a pad a day.  Patient endorses abdominal cramping, but no other symptoms.  Onset: Last Wednesday, April 19  Duration: 1 week  Exacerbating / Alleviating factors: Patient does not identify any exacerbating or alleviating factors.  Associated symptoms: Mild abdominal cramping.      Nurses Notes reviewed and agree, including vitals, allergies, social history and prior medical history.     REVIEW OF SYSTEMS: All systems reviewed and not pertinent unless noted.    Review of Systems   Gastrointestinal: Positive for abdominal pain.        Mild cramping   Genitourinary: Positive for vaginal bleeding.   All other systems reviewed and are negative.      Past Medical History:   Diagnosis Date   • Varicella        Allergies:    Cephalosporins and Albuterol      History reviewed. No pertinent surgical history.      Social History     Socioeconomic History   • Marital status:    Tobacco Use   • Smoking status: Never   • Smokeless tobacco: Never   Substance and Sexual Activity   • Alcohol use: No   • Drug use: No   • Sexual activity: Yes     Partners: Male     Birth control/protection: Coitus interruptus         Family History   Problem Relation Age of Onset   • Diabetes Maternal Aunt         Great aunt   • Diabetes Maternal Uncle         Great Uncle   • Anxiety  "disorder Mother    • Depression Mother    • Anxiety disorder Maternal Grandmother    • COPD Maternal Grandmother    • Hyperlipidemia Maternal Grandfather    • Arthritis Paternal Grandmother         Great maternal grandmother       Objective  Physical Exam:  /64   Pulse 70   Temp 98.7 °F (37.1 °C) (Oral)   Resp 16   Ht 160 cm (63\")   Wt 61.2 kg (135 lb)   LMP 01/10/2023 (Exact Date)   SpO2 98%   BMI 23.91 kg/m²      Physical Exam  Constitutional:       General: She is not in acute distress.     Appearance: Normal appearance.   HENT:      Head: Normocephalic and atraumatic.      Right Ear: External ear normal.      Left Ear: External ear normal.      Nose: Nose normal.   Eyes:      General: No scleral icterus.     Extraocular Movements: Extraocular movements intact.      Conjunctiva/sclera: Conjunctivae normal.   Cardiovascular:      Rate and Rhythm: Normal rate and regular rhythm.      Pulses: Normal pulses.      Heart sounds: Normal heart sounds.   Pulmonary:      Effort: Pulmonary effort is normal.      Breath sounds: Normal breath sounds. No stridor. No wheezing.   Abdominal:      General: There is no distension.      Palpations: Abdomen is soft.      Tenderness: There is no abdominal tenderness.   Musculoskeletal:         General: Normal range of motion.      Cervical back: Normal range of motion and neck supple. No rigidity.   Skin:     General: Skin is warm and dry.      Coloration: Skin is not jaundiced.   Neurological:      General: No focal deficit present.      Mental Status: She is alert and oriented to person, place, and time.      Motor: No weakness.   Psychiatric:         Mood and Affect: Mood normal.         Behavior: Behavior normal.         Thought Content: Thought content normal.         Judgment: Judgment normal.           Procedures    ED Course:    ED Course as of 04/26/23 2122 Wed Apr 26, 2023 2109 Discussed with Dr. Kim, if spotting remains light, she can keep her current " appointment if it increases she can call for closer follow-up [CS]      ED Course User Index  [CS] Hung Bailey Jr., CORIE       Lab Results (last 24 hours)     Procedure Component Value Units Date/Time    Urinalysis With Culture If Indicated - Urine, Clean Catch [[id number]]  (Abnormal) Collected: 04/26/23 1913    Specimen: Urine, Clean Catch Updated: 04/26/23 1921     Color, UA Yellow     Appearance, UA Turbid     pH, UA 7.0     Specific Gravity, UA 1.014     Glucose, UA Negative     Ketones, UA Negative     Bilirubin, UA Negative     Blood, UA Negative     Protein, UA Negative     Leuk Esterase, UA Negative     Nitrite, UA Negative     Urobilinogen, UA 0.2 E.U./dL    Narrative:      In absence of clinical symptoms, the presence of pyuria, bacteria, and/or nitrites on the urinalysis result does not correlate with infection.  Urine microscopic not indicated.    Pregnancy, Urine - Urine, Clean Catch [[id number]]  (Abnormal) Collected: 04/26/23 1913    Specimen: Urine, Clean Catch Updated: 04/26/23 1920     HCG, Urine QL Positive    CBC Auto Differential [[id number]]  (Abnormal) Collected: 04/26/23 1928    Specimen: Blood Updated: 04/26/23 1934     WBC 10.97 10*3/mm3      RBC 3.80 10*6/mm3      Hemoglobin 12.0 g/dL      Hematocrit 34.1 %      MCV 89.7 fL      MCH 31.6 pg      MCHC 35.2 g/dL      RDW 13.2 %      RDW-SD 43.8 fl      MPV 10.4 fL      Platelets 210 10*3/mm3      Neutrophil % 68.1 %      Lymphocyte % 25.6 %      Monocyte % 4.5 %      Eosinophil % 1.0 %      Basophil % 0.4 %      Immature Grans % 0.4 %      Neutrophils, Absolute 7.48 10*3/mm3      Lymphocytes, Absolute 2.81 10*3/mm3      Monocytes, Absolute 0.49 10*3/mm3      Eosinophils, Absolute 0.11 10*3/mm3      Basophils, Absolute 0.04 10*3/mm3      Immature Grans, Absolute 0.04 10*3/mm3      nRBC 0.0 /100 WBC     Comprehensive Metabolic Panel [[id number]]  (Abnormal) Collected: 04/26/23 1928    Specimen: Blood Updated: 04/26/23 1952     Glucose 99  mg/dL      BUN 8 mg/dL      Creatinine 0.45 mg/dL      Sodium 136 mmol/L      Potassium 3.6 mmol/L      Chloride 102 mmol/L      CO2 23.8 mmol/L      Calcium 9.0 mg/dL      Total Protein 6.9 g/dL      Albumin 4.0 g/dL      ALT (SGPT) 10 U/L      AST (SGOT) 14 U/L      Alkaline Phosphatase 44 U/L      Total Bilirubin 0.2 mg/dL      Globulin 2.9 gm/dL      A/G Ratio 1.4 g/dL      BUN/Creatinine Ratio 17.8     Anion Gap 10.2 mmol/L      eGFR 136.3 mL/min/1.73     Narrative:      GFR Normal >60  Chronic Kidney Disease <60  Kidney Failure <15      hCG, Quantitative, Pregnancy [[id number]] Collected: 04/26/23 1928    Specimen: Blood Updated: 04/26/23 2028     HCG Quantitative 25,036.00 mIU/mL     Narrative:      HCG Ranges by Gestational Age    Females - non-pregnant premenopausal   </= 1mIU/mL HCG  Females - postmenopausal               </= 7mIU/mL HCG    3 Weeks         5.8 -    71.2 mIU/mL  4 Weeks         9.5 -     750 mIU/mL  5 Weeks         217 -   7,138 mIU/mL  6 Weeks         158 -  31,795 mIU/mL  7 Weeks       3,697 - 163,563 mIU/mL  8 Weeks      32,065 - 149,571 mIU/mL  9 Weeks      63,803 - 151,410 mIU/mL  10 Weeks     46,509 - 186,977 mIU/mL  12 Weeks     27,832 - 210,612 mIU/mL  14 Weeks     13,950 -  62,530 mIU/mL  15 Weeks     12,039 -  70,971 mIU/mL  16 Weeks      9,040 -  56,451 mIU/mL  17 Weeks      8,175 -  55,868 mIU/mL  18 Weeks      8,099 -  58,176 mIU/mL           No radiology results from the last 24 hrs       Medical Decision Making  28-year-old female presents with vaginal bleeding in pregnancy, differential would include threatened miscarriage, previa placenta, miscarriage, hemorrhage.  I evaluated the patient the bedside, she had no pertinent physical exam findings.  Labs were drawn, reviewed and interpreted myself and discussed with the patient at the bedside.  An ultrasound was performed that showed live intrauterine pregnancy with a normal heart rate.  Discussed the case with the on-call  gynecologist Dr. Kim he recommended pelvic rest, and keep current appointment, if symptoms worsen to follow-up sooner.  Patient is stable upon reevaluation and can be discharged home, she got instructions on pelvic rest, and signs and symptoms to look for to return if worse.    Threatened miscarriage: acute illness or injury  Amount and/or Complexity of Data Reviewed  Labs: ordered.  Radiology: ordered.            Final diagnoses:   Threatened miscarriage        Hung Bailey Jr., PASIM  04/26/23 2122

## 2023-04-27 ENCOUNTER — TELEPHONE (OUTPATIENT)
Dept: OBSTETRICS AND GYNECOLOGY | Facility: CLINIC | Age: 28
End: 2023-04-27

## 2023-04-27 NOTE — TELEPHONE ENCOUNTER
Caller: Della Martin    Relationship: Self    Best call back number: [phone]    What is the best time to reach you: ANY TIME    Who are you requesting to speak with (clinical staff, provider,  specific staff member): NURSE    What was the call regarding: PATIENT WAS SEEN IN ED ON 4/26/23 FOR BLEEDING AND CRAMPING - PATIENT IS VERY CONCERNED BECAUSE HGC LEVEL DROPPED 20,000 SINCE LAST FRIDAY AND WOULD LIKE A CALL BACK FROM A NURSE    Do you require a callback: YES

## 2023-04-28 ENCOUNTER — ROUTINE PRENATAL (OUTPATIENT)
Dept: OBSTETRICS AND GYNECOLOGY | Facility: CLINIC | Age: 28
End: 2023-04-28
Payer: COMMERCIAL

## 2023-04-28 VITALS — BODY MASS INDEX: 24.27 KG/M2 | DIASTOLIC BLOOD PRESSURE: 70 MMHG | WEIGHT: 137 LBS | SYSTOLIC BLOOD PRESSURE: 110 MMHG

## 2023-04-28 DIAGNOSIS — O46.92 VAGINAL BLEEDING IN PREGNANCY, SECOND TRIMESTER: ICD-10-CM

## 2023-04-28 DIAGNOSIS — Z34.92 PRENATAL CARE IN SECOND TRIMESTER: Primary | ICD-10-CM

## 2023-04-28 PROCEDURE — 0502F SUBSEQUENT PRENATAL CARE: CPT | Performed by: STUDENT IN AN ORGANIZED HEALTH CARE EDUCATION/TRAINING PROGRAM

## 2023-04-28 RX ORDER — ONDANSETRON 4 MG/1
4 TABLET, ORALLY DISINTEGRATING ORAL EVERY 8 HOURS PRN
Qty: 30 TABLET | Refills: 5 | Status: SHIPPED | OUTPATIENT
Start: 2023-04-28

## 2023-04-28 NOTE — PROGRESS NOTES
Prenatal Care Visit    Subjective   Chief Complaint   Patient presents with   • Pregnancy Ultrasound     Scan done today due to HCG levels dropping.  Doing well     History:   Della is a  currently at 15w3d who presents for a prenatal care visit today.    Della presents for ED follow up. She was seen in the ED 23 and 23 for vaginal spotting in pregnancy. She was unable to stay for an ultrasound the , but ultrasound on the  was reassuring with (+) FCA. She did notice that her Hcg had dropped about 20,000 and this concerned her, prompting her to schedule a follow up.    Reports some continued spotting, better than earlier this week. At the time her bleeding was at its worst, it was pink-brown spotting with wiping and associated with cramping. She is not cramping today. Reports (+) FM.     Objective   /70   Wt 62.1 kg (137 lb)   LMP 01/10/2023 (Exact Date)   BMI 24.27 kg/m²   Physical Exam:  Normal, gestational age-appropriate exam today      Assessment & Plan     1. IUP @ 15w3d  2. Routine care: I have reviewed the prenatal labs and ultrasound(s) today. I have reviewed the most recent prenatal progress note(s).   3. Vaginal bleeding in pregnancy: advised pelvic rest, take it easy and report to ED if bleeding worsens     Diagnosis Plan   1. Prenatal care in second trimester        2. Vaginal bleeding in pregnancy, second trimester          Medication Management: Zofran sent to pharmacy for N/V    Topics discussed: Prenatal care milestones  SAB precautions/ pelvic rest  Fetal movement   Tests next visit: U/S for anatomic screening   Next visit: 4 week(s)     Sandra Brothers MD  Obstetrics and Gynecology  [facility]

## 2023-05-01 ENCOUNTER — TELEPHONE (OUTPATIENT)
Dept: OBSTETRICS AND GYNECOLOGY | Facility: CLINIC | Age: 28
End: 2023-05-01
Payer: COMMERCIAL

## 2023-05-26 ENCOUNTER — ROUTINE PRENATAL (OUTPATIENT)
Dept: OBSTETRICS AND GYNECOLOGY | Facility: CLINIC | Age: 28
End: 2023-05-26

## 2023-05-26 VITALS — DIASTOLIC BLOOD PRESSURE: 62 MMHG | BODY MASS INDEX: 24.62 KG/M2 | SYSTOLIC BLOOD PRESSURE: 104 MMHG | WEIGHT: 139 LBS

## 2023-05-26 DIAGNOSIS — Z34.92 PRENATAL CARE IN SECOND TRIMESTER: Primary | ICD-10-CM

## 2023-05-26 PROCEDURE — 0502F SUBSEQUENT PRENATAL CARE: CPT | Performed by: OBSTETRICS & GYNECOLOGY

## 2023-05-30 NOTE — PROGRESS NOTES
Prenatal Care Visit    Subjective   Chief Complaint   Patient presents with   • Routine Prenatal Visit     Anatomy scan, feels like she cannot stand for long periods of time.       History:   Della is a  currently at 19w3d who presents for a prenatal care visit today.    No major issues.    Social History    Tobacco Use      Smoking status: Never      Smokeless tobacco: Never       Objective   /62   Wt 63 kg (139 lb)   LMP 01/10/2023 (Exact Date)   BMI 24.62 kg/m²   Physical Exam:  Normal, gestational age-appropriate exam today        Plan   Medical Decision Making:    I have reviewed the prenatal labs and ultrasound(s) today. I have reviewed the most recent prenatal progress note(s).    Diagnosis: Supervision of low risk pregnancy   Tests/Orders/Rx today: No orders of the defined types were placed in this encounter.      Medication Management: None     Topics discussed: Prenatal care milestones  U/S findings   Tests next visit: none   Next visit: 4 week(s)     Darin Alexis MD  Obstetrics and Gynecology  [facility]

## 2023-06-19 ENCOUNTER — TELEPHONE (OUTPATIENT)
Dept: OBSTETRICS AND GYNECOLOGY | Facility: CLINIC | Age: 28
End: 2023-06-19
Payer: COMMERCIAL

## 2023-06-19 RX ORDER — FLUCONAZOLE 150 MG/1
150 TABLET ORAL DAILY
Qty: 1 TABLET | Refills: 0 | Status: SHIPPED | OUTPATIENT
Start: 2023-06-19

## 2023-06-19 NOTE — TELEPHONE ENCOUNTER
Caller: BRENT CID    Relationship: SELF            Best call back number:   Requested Prescriptions:   Requested Prescriptions      No prescriptions requested or ordered in this encounter   DIFLUCAN     Pharmacy where request should be sent:    PREFERRED     Last office visit with prescribing clinician: Visit date not found   Last telemedicine visit with prescribing clinician: Visit date not found   Next office visit with prescribing clinician: 6/28/2023     Additional details provided by patient:     PT IS HAVING YEAST INFECTION SX AGAIN    Does the patient have less than a 3 day supply:  [x] Yes  [] No    Would you like a call back once the refill request has been completed: [x] Yes [] No    If the office needs to give you a call back, can they leave a voicemail: [x] Yes [] No    Rahat Frazier Rep   06/19/23 15:41 EDT

## 2023-08-16 ENCOUNTER — ROUTINE PRENATAL (OUTPATIENT)
Dept: OBSTETRICS AND GYNECOLOGY | Facility: CLINIC | Age: 28
End: 2023-08-16
Payer: COMMERCIAL

## 2023-08-16 VITALS — BODY MASS INDEX: 27.28 KG/M2 | WEIGHT: 154 LBS | SYSTOLIC BLOOD PRESSURE: 112 MMHG | DIASTOLIC BLOOD PRESSURE: 72 MMHG

## 2023-08-16 DIAGNOSIS — Z34.83 ENCOUNTER FOR SUPERVISION OF OTHER NORMAL PREGNANCY IN THIRD TRIMESTER: Primary | ICD-10-CM

## 2023-08-16 PROCEDURE — 0502F SUBSEQUENT PRENATAL CARE: CPT | Performed by: STUDENT IN AN ORGANIZED HEALTH CARE EDUCATION/TRAINING PROGRAM

## 2023-08-16 NOTE — PROGRESS NOTES
Prenatal Care Visit    Subjective   Chief Complaint   Patient presents with    Routine Prenatal Visit     History:   Della is a  currently at 31w1d who presents for a prenatal care visit today.    Reports increased lower abdominal pressure/ soreness. She also reports waking up feeling as if she was having a contraction the past two days. She has had a few CTX throughout the day. Denies VB, LOF. Reports (+) FM.     Objective   /72   Wt 69.9 kg (154 lb)   LMP 01/10/2023 (Exact Date)   BMI 27.28 kg/mý   Physical Exam:  Normal, gestational age-appropriate exam today      Assessment & Plan     IUP @ 31w1d  Routine care: I have reviewed the prenatal labs and ultrasound(s) today. I have reviewed the most recent prenatal progress note(s).      Diagnosis Plan   1. Encounter for supervision of other normal pregnancy in third trimester          Medication Management: Continue PNV    Topics discussed: Prenatal care milestones  Kick counts and fetal movement   labor signs and symptoms   Tests next visit: U/S for EFW   Next visit: 2 week(s)     Sandra Brothers MD  Obstetrics and Gynecology  [facility]

## 2023-08-30 ENCOUNTER — ROUTINE PRENATAL (OUTPATIENT)
Dept: OBSTETRICS AND GYNECOLOGY | Facility: CLINIC | Age: 28
End: 2023-08-30
Payer: COMMERCIAL

## 2023-08-30 VITALS — DIASTOLIC BLOOD PRESSURE: 68 MMHG | BODY MASS INDEX: 27.63 KG/M2 | WEIGHT: 156 LBS | SYSTOLIC BLOOD PRESSURE: 102 MMHG

## 2023-08-30 DIAGNOSIS — Z34.93 PRENATAL CARE IN THIRD TRIMESTER: Primary | ICD-10-CM

## 2023-08-30 DIAGNOSIS — Z36.89 ENCOUNTER FOR ULTRASOUND TO ASSESS FETAL GROWTH: ICD-10-CM

## 2023-08-30 PROCEDURE — 0502F SUBSEQUENT PRENATAL CARE: CPT | Performed by: OBSTETRICS & GYNECOLOGY

## 2023-08-30 NOTE — PROGRESS NOTES
Prenatal Care Visit    Subjective   Chief Complaint   Patient presents with    Routine Prenatal Visit     No Complaints/concerns        History:   Della is a  currently at 33w1d who presents for a prenatal care visit today.    No major issues.    Social History    Tobacco Use      Smoking status: Never      Smokeless tobacco: Never       Objective   /68   Wt 70.8 kg (156 lb)   LMP 01/10/2023 (Exact Date)   BMI 27.63 kg/mý   Physical Exam:  Normal, gestational age-appropriate exam today        Plan   Medical Decision Making:    I have reviewed the prenatal labs and ultrasound(s) today. I have reviewed the most recent prenatal progress note(s).    Diagnosis: Supervision of low risk pregnancy   Tests/Orders/Rx today: Orders Placed This Encounter   Procedures    US Ob Follow Up Transabdominal Approach     Order Specific Question:   Reason for Exam:     Answer:   growth scan     Order Specific Question:   Release to patient     Answer:   Routine Release [[id number]]       Medication Management: None     Topics discussed: Prenatal care milestones  kick counts and fetal movement  PIH precautions   labor signs and symptoms  U/S findings   Tests next visit: none   Next visit: 2 week(s)     Darin Alexis MD  Obstetrics and Gynecology  [facility]

## 2023-09-13 ENCOUNTER — ROUTINE PRENATAL (OUTPATIENT)
Dept: OBSTETRICS AND GYNECOLOGY | Facility: CLINIC | Age: 28
End: 2023-09-13
Payer: COMMERCIAL

## 2023-09-13 VITALS — WEIGHT: 161.4 LBS | SYSTOLIC BLOOD PRESSURE: 110 MMHG | DIASTOLIC BLOOD PRESSURE: 64 MMHG | BODY MASS INDEX: 28.59 KG/M2

## 2023-09-13 DIAGNOSIS — Z34.93 THIRD TRIMESTER PREGNANCY: Primary | ICD-10-CM

## 2023-09-13 PROCEDURE — 0502F SUBSEQUENT PRENATAL CARE: CPT | Performed by: OBSTETRICS & GYNECOLOGY

## 2023-09-13 RX ORDER — FAMOTIDINE 10 MG/ML
20 INJECTION, SOLUTION INTRAVENOUS ONCE
OUTPATIENT
Start: 2023-09-13 | End: 2023-09-13

## 2023-09-13 RX ORDER — SODIUM CHLORIDE, SODIUM LACTATE, POTASSIUM CHLORIDE, CALCIUM CHLORIDE 600; 310; 30; 20 MG/100ML; MG/100ML; MG/100ML; MG/100ML
125 INJECTION, SOLUTION INTRAVENOUS CONTINUOUS
OUTPATIENT
Start: 2023-09-13

## 2023-09-13 RX ORDER — OXYTOCIN/0.9 % SODIUM CHLORIDE 30/500 ML
333 PLASTIC BAG, INJECTION (ML) INTRAVENOUS ONCE
OUTPATIENT
Start: 2023-09-13

## 2023-09-13 RX ORDER — SODIUM CHLORIDE 0.9 % (FLUSH) 0.9 %
10 SYRINGE (ML) INJECTION EVERY 12 HOURS SCHEDULED
OUTPATIENT
Start: 2023-09-13

## 2023-09-13 RX ORDER — OXYTOCIN/0.9 % SODIUM CHLORIDE 30/500 ML
42 PLASTIC BAG, INJECTION (ML) INTRAVENOUS AS NEEDED
OUTPATIENT
Start: 2023-09-13 | End: 2023-09-14

## 2023-09-13 RX ORDER — CARBOPROST TROMETHAMINE 250 UG/ML
250 INJECTION, SOLUTION INTRAMUSCULAR AS NEEDED
OUTPATIENT
Start: 2023-09-13

## 2023-09-13 RX ORDER — MISOPROSTOL 100 UG/1
800 TABLET ORAL AS NEEDED
OUTPATIENT
Start: 2023-09-13

## 2023-09-13 RX ORDER — ACETAMINOPHEN 500 MG
1000 TABLET ORAL ONCE
OUTPATIENT
Start: 2023-09-13 | End: 2023-09-13

## 2023-09-13 RX ORDER — LIDOCAINE HYDROCHLORIDE 10 MG/ML
0.5 INJECTION, SOLUTION INFILTRATION; PERINEURAL ONCE AS NEEDED
OUTPATIENT
Start: 2023-09-13

## 2023-09-13 RX ORDER — KETOROLAC TROMETHAMINE 15 MG/ML
30 INJECTION, SOLUTION INTRAMUSCULAR; INTRAVENOUS ONCE
OUTPATIENT
Start: 2023-09-13 | End: 2023-09-13

## 2023-09-13 RX ORDER — CITRIC ACID/SODIUM CITRATE 334-500MG
30 SOLUTION, ORAL ORAL ONCE
OUTPATIENT
Start: 2023-09-13 | End: 2023-09-13

## 2023-09-13 RX ORDER — SODIUM CHLORIDE 9 MG/ML
40 INJECTION, SOLUTION INTRAVENOUS AS NEEDED
OUTPATIENT
Start: 2023-09-13

## 2023-09-13 RX ORDER — METHYLERGONOVINE MALEATE 0.2 MG/ML
200 INJECTION INTRAVENOUS ONCE AS NEEDED
OUTPATIENT
Start: 2023-09-13

## 2023-09-13 RX ORDER — SODIUM CHLORIDE 0.9 % (FLUSH) 0.9 %
10 SYRINGE (ML) INJECTION AS NEEDED
OUTPATIENT
Start: 2023-09-13

## 2023-09-13 NOTE — PROGRESS NOTES
Chief Complaint   Patient presents with    Routine Prenatal Visit     Prenatal visit with no problems or concerns        HPI:   , 35w1d gestation reports doing well    ROS:  See Prenatal Episode/Flowsheet  /64   Wt 73.2 kg (161 lb 6.4 oz)   LMP 01/10/2023 (Exact Date)   BMI 28.59 kg/m²      EXAM:  EXTREMITIES:  No swelling-See Prenatal Episode/Flowsheet    ABDOMEN:  FHTs/Movement noted-See Prenatal Episode/Flowsheet    URINE GLUCOSE/PROTEIN:  See Prenatal Episode/Flowsheet    PELVIC EXAM:  See Prenatal Episode/Flowsheet  CV:  Lungs:  GYN:    MDM:    Lab Results   Component Value Date    HGB 11.8 (L) 2023    RUBELLAABIGG 1.72 2023    HEPBSAG Negative 2023    ABO O 2023    RH Positive 2023    ABSCRN Negative 2023    [id number] Non Reactive 2023    HEPCVIRUSABY Non Reactive 2023    STREPGPB Negative 2020    URINECX Final report 2021       U/S:US Ob Follow Up Transabdominal Approach (2023 13:01)     1. IUP 35w1d  2. Routine care   3. CBC  4. GBS next imte  5. Proven to 6#2   Desires primary C/S-- R/B A

## 2023-09-14 LAB
BASOPHILS # BLD AUTO: 0.03 10*3/MM3 (ref 0–0.2)
BASOPHILS NFR BLD AUTO: 0.3 % (ref 0–1.5)
EOSINOPHIL # BLD AUTO: 0.1 10*3/MM3 (ref 0–0.4)
EOSINOPHIL NFR BLD AUTO: 1 % (ref 0.3–6.2)
ERYTHROCYTE [DISTWIDTH] IN BLOOD BY AUTOMATED COUNT: 12.6 % (ref 12.3–15.4)
HCT VFR BLD AUTO: 33.3 % (ref 34–46.6)
HGB BLD-MCNC: 11.4 G/DL (ref 12–15.9)
IMM GRANULOCYTES # BLD AUTO: 0.05 10*3/MM3 (ref 0–0.05)
IMM GRANULOCYTES NFR BLD AUTO: 0.5 % (ref 0–0.5)
LYMPHOCYTES # BLD AUTO: 1.7 10*3/MM3 (ref 0.7–3.1)
LYMPHOCYTES NFR BLD AUTO: 16.6 % (ref 19.6–45.3)
MCH RBC QN AUTO: 31.1 PG (ref 26.6–33)
MCHC RBC AUTO-ENTMCNC: 34.2 G/DL (ref 31.5–35.7)
MCV RBC AUTO: 91 FL (ref 79–97)
MONOCYTES # BLD AUTO: 0.7 10*3/MM3 (ref 0.1–0.9)
MONOCYTES NFR BLD AUTO: 6.8 % (ref 5–12)
NEUTROPHILS # BLD AUTO: 7.64 10*3/MM3 (ref 1.7–7)
NEUTROPHILS NFR BLD AUTO: 74.8 % (ref 42.7–76)
NRBC BLD AUTO-RTO: 0 /100 WBC (ref 0–0.2)
PLATELET # BLD AUTO: 224 10*3/MM3 (ref 140–450)
RBC # BLD AUTO: 3.66 10*6/MM3 (ref 3.77–5.28)
WBC # BLD AUTO: 10.22 10*3/MM3 (ref 3.4–10.8)

## 2023-09-14 RX ORDER — FERROUS SULFATE 325(65) MG
325 TABLET ORAL
Qty: 60 TABLET | Refills: 10 | Status: SHIPPED | OUTPATIENT
Start: 2023-09-14

## 2023-09-18 ENCOUNTER — ROUTINE PRENATAL (OUTPATIENT)
Dept: OBSTETRICS AND GYNECOLOGY | Facility: CLINIC | Age: 28
End: 2023-09-18
Payer: COMMERCIAL

## 2023-09-18 VITALS — SYSTOLIC BLOOD PRESSURE: 108 MMHG | DIASTOLIC BLOOD PRESSURE: 68 MMHG | BODY MASS INDEX: 28.7 KG/M2 | WEIGHT: 162 LBS

## 2023-09-18 DIAGNOSIS — Z34.83 ENCOUNTER FOR SUPERVISION OF OTHER NORMAL PREGNANCY IN THIRD TRIMESTER: Primary | ICD-10-CM

## 2023-09-18 DIAGNOSIS — O99.820 GBS (GROUP B STREPTOCOCCUS CARRIER), +RV CULTURE, CURRENTLY PREGNANT: ICD-10-CM

## 2023-09-18 PROCEDURE — 0502F SUBSEQUENT PRENATAL CARE: CPT | Performed by: MIDWIFE

## 2023-09-18 NOTE — PROGRESS NOTES
Chief Complaint   Patient presents with    Routine Prenatal Visit     No Complaints/concerns        HPI: Della is a  currently at 35w6d here for prenatal visit who reports the following:  Baby is active. She has had irregular ctxs, denies leakage of fluid or vaginal bleeding                EXAM:   There were no vitals filed for this visit.   Abdomen:   See prenatal flowsheet as noted and reviewed, soft, nontender   Pelvic:  See prenatal flowsheet as noted and reviewed   Urine:  See prenatal flowsheet as noted and reviewed    Lab Results   Component Value Date    ABO O 2023    RH Positive 2023    ABSCRN Negative 2023       MDM:  Impression: Supervision of low risk pregnancy  Anemia in pregnancy   Tests done today: GBS testing   Topics discussed: kick counts and fetal movement   labor signs and symptoms  Reviewed OB labs   Tests next visit: none                RTO:                        1 weeks    This note was electronically signed.  Trini Brown, TRISH  2023

## 2023-09-22 LAB — B-HEM STREP SPEC QL CULT: NEGATIVE

## 2023-09-25 ENCOUNTER — ROUTINE PRENATAL (OUTPATIENT)
Dept: OBSTETRICS AND GYNECOLOGY | Facility: CLINIC | Age: 28
End: 2023-09-25

## 2023-09-25 VITALS — DIASTOLIC BLOOD PRESSURE: 70 MMHG | BODY MASS INDEX: 28.87 KG/M2 | SYSTOLIC BLOOD PRESSURE: 108 MMHG | WEIGHT: 163 LBS

## 2023-09-25 DIAGNOSIS — Z34.93 THIRD TRIMESTER PREGNANCY: Primary | ICD-10-CM

## 2023-09-25 PROBLEM — Z34.90 PREGNANCY: Status: RESOLVED | Noted: 2020-05-06 | Resolved: 2023-09-25

## 2023-09-25 NOTE — PROGRESS NOTES
Chief Complaint   Patient presents with    Routine Prenatal Visit     No Complaints/concerns        HPI: Della is a  currently at 36w6d here for prenatal visit who reports the following:  Baby is active. She had a few ctxs yesterday. She has decided she wants a tubal with her elective Csection. Iron supplement making her sick.                EXAM:     Vitals:    23 1344   BP: 108/70      Abdomen:   See prenatal flowsheet as noted and reviewed, soft, nontender   Pelvic:  See prenatal flowsheet as noted and reviewed   Urine:  See prenatal flowsheet as noted and reviewed    Lab Results   Component Value Date    ABO O 2023    RH Positive 2023    ABSCRN Negative 2023       MDM:  Impression: Supervision of low risk pregnancy  Anemia in pregnancy   Tests done today: none   Topics discussed: kick counts and fetal movement  labor signs and symptoms  tubal risks, benefits  Reviewed OB labs   Tests next visit: none                RTO:                        1 weeks    This note was electronically signed.  Trini Brown, TRISH  2023

## 2023-09-27 ENCOUNTER — HOSPITAL ENCOUNTER (OUTPATIENT)
Facility: HOSPITAL | Age: 28
Discharge: HOME | End: 2023-09-27
Attending: OBSTETRICS & GYNECOLOGY | Admitting: OBSTETRICS & GYNECOLOGY
Payer: COMMERCIAL

## 2023-09-27 VITALS
WEIGHT: 164 LBS | OXYGEN SATURATION: 97 % | HEIGHT: 63 IN | BODY MASS INDEX: 29.06 KG/M2 | RESPIRATION RATE: 16 BRPM | DIASTOLIC BLOOD PRESSURE: 61 MMHG | SYSTOLIC BLOOD PRESSURE: 99 MMHG | HEART RATE: 99 BPM | TEMPERATURE: 98.9 F

## 2023-09-27 LAB
BILIRUB BLD-MCNC: NEGATIVE MG/DL
CLARITY, POC: CLEAR
COLOR UR: YELLOW
GLUCOSE UR STRIP-MCNC: NEGATIVE MG/DL
KETONES UR QL: NEGATIVE
LEUKOCYTE EST, POC: NEGATIVE
NITRITE UR-MCNC: NEGATIVE MG/ML
PH UR: 6.5 [PH] (ref 5–8)
PROT UR STRIP-MCNC: ABNORMAL MG/DL
RBC # UR STRIP: NEGATIVE /UL
SP GR UR: 1.02 (ref 1–1.03)
UROBILINOGEN UR QL: NORMAL

## 2023-09-27 PROCEDURE — G0463 HOSPITAL OUTPT CLINIC VISIT: HCPCS

## 2023-09-27 PROCEDURE — 59025 FETAL NON-STRESS TEST: CPT

## 2023-09-27 PROCEDURE — 81002 URINALYSIS NONAUTO W/O SCOPE: CPT | Performed by: OBSTETRICS & GYNECOLOGY

## 2023-09-27 RX ORDER — LIDOCAINE HYDROCHLORIDE 10 MG/ML
0.5 INJECTION, SOLUTION INFILTRATION; PERINEURAL ONCE AS NEEDED
Status: DISCONTINUED | OUTPATIENT
Start: 2023-09-27 | End: 2023-09-27 | Stop reason: HOSPADM
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
## 2023-10-02 ENCOUNTER — ROUTINE PRENATAL (OUTPATIENT)
Dept: OBSTETRICS AND GYNECOLOGY | Facility: CLINIC | Age: 28
End: 2023-10-02
Payer: COMMERCIAL

## 2023-10-02 VITALS — WEIGHT: 166.6 LBS | DIASTOLIC BLOOD PRESSURE: 72 MMHG | SYSTOLIC BLOOD PRESSURE: 124 MMHG | BODY MASS INDEX: 29.51 KG/M2

## 2023-10-02 DIAGNOSIS — Z34.93 THIRD TRIMESTER PREGNANCY: Primary | ICD-10-CM

## 2023-10-02 PROCEDURE — 0502F SUBSEQUENT PRENATAL CARE: CPT | Performed by: OBSTETRICS & GYNECOLOGY

## 2023-10-02 NOTE — PROGRESS NOTES
Chief Complaint   Patient presents with    Routine Prenatal Visit     Prenatal visit with no problems or concerns.        HPI:   , 37w6d gestation reports doing well    ROS:  See Prenatal Episode/Flowsheet  /72   Wt 75.6 kg (166 lb 9.6 oz)   LMP 01/10/2023 (Exact Date)   BMI 29.51 kg/m²      EXAM:  EXTREMITIES:  No swelling-See Prenatal Episode/Flowsheet    ABDOMEN:  FHTs/Movement noted-See Prenatal Episode/Flowsheet    URINE GLUCOSE/PROTEIN:  See Prenatal Episode/Flowsheet    PELVIC EXAM:  See Prenatal Episode/Flowsheet  CV:  Lungs:  GYN:    MDM:    Lab Results   Component Value Date    HGB 11.4 (L) 2023    RUBELLAABIGG 1.72 2023    HEPBSAG Negative 2023    ABO O 2023    RH Positive 2023    ABSCRN Negative 2023    [id number] Non Reactive 2023    HEPCVIRUSABY Non Reactive 2023    STREPGPB Negative 2023    URINECX Final report 2021       U/S:US Ob Follow Up Transabdominal Approach (2023 13:01)     1. IUP 37w6d  2. Routine care   3. Cervix 1/60%, head low    Still wants C/S no matter what

## 2023-10-04 ENCOUNTER — ROUTINE PRENATAL (OUTPATIENT)
Dept: OBSTETRICS AND GYNECOLOGY | Facility: CLINIC | Age: 28
End: 2023-10-04
Payer: COMMERCIAL

## 2023-10-04 VITALS — WEIGHT: 166 LBS | BODY MASS INDEX: 29.41 KG/M2 | SYSTOLIC BLOOD PRESSURE: 112 MMHG | DIASTOLIC BLOOD PRESSURE: 62 MMHG

## 2023-10-04 DIAGNOSIS — Z34.93 PRENATAL CARE IN THIRD TRIMESTER: Primary | ICD-10-CM

## 2023-10-04 DIAGNOSIS — R30.9 PAINFUL URINATION: ICD-10-CM

## 2023-10-04 DIAGNOSIS — R30.0 DYSURIA: ICD-10-CM

## 2023-10-04 LAB
BILIRUB BLD-MCNC: NEGATIVE MG/DL
CLARITY, POC: ABNORMAL
COLOR UR: YELLOW
GLUCOSE UR STRIP-MCNC: NEGATIVE MG/DL
KETONES UR QL: ABNORMAL
LEUKOCYTE EST, POC: NEGATIVE
NITRITE UR-MCNC: NEGATIVE MG/ML
PH UR: 6 [PH] (ref 5–8)
PROT UR STRIP-MCNC: ABNORMAL MG/DL
RBC # UR STRIP: NEGATIVE /UL
SP GR UR: 1.02 (ref 1–1.03)
UROBILINOGEN UR QL: NORMAL

## 2023-10-04 NOTE — PROGRESS NOTES
Prenatal Care Visit    Subjective   Chief Complaint   Patient presents with    Routine Prenatal Visit     Painful urination       History:   Della is a  currently at 38w1d who presents for a prenatal care visit today.    Dysuria starting today.    Social History    Tobacco Use      Smoking status: Never      Smokeless tobacco: Never       Objective   /62   Wt 75.3 kg (166 lb)   LMP 01/10/2023 (Exact Date)   BMI 29.41 kg/m²   Physical Exam:  Normal, gestational age-appropriate exam today        Plan   Medical Decision Making:    I have reviewed the prenatal labs and ultrasound(s) today. I have reviewed the most recent prenatal progress note(s).    Diagnosis: Supervision of low risk pregnancy   Tests/Orders/Rx today: Orders Placed This Encounter   Procedures    Urinalysis With Culture If Indicated - Urine, Clean Catch     Order Specific Question:   Release to patient     Answer:   Routine Release [[id number]]    POC Urinalysis Dipstick     Order Specific Question:   Release to patient     Answer:   Routine Release [[id number]]       Medication Management: None     Topics discussed: Prenatal care milestones  kick counts and fetal movement  labor signs and symptoms  PIH precautions  Urinary symptoms  Primary C/S on 10/10   Tests next visit: none   Next visit: none     Darin Alexis MD  Obstetrics and Gynecology  [facility]

## 2023-10-09 ENCOUNTER — ROUTINE PRENATAL (OUTPATIENT)
Dept: OBSTETRICS AND GYNECOLOGY | Facility: CLINIC | Age: 28
End: 2023-10-09
Payer: COMMERCIAL

## 2023-10-09 VITALS — BODY MASS INDEX: 29.58 KG/M2 | DIASTOLIC BLOOD PRESSURE: 64 MMHG | SYSTOLIC BLOOD PRESSURE: 110 MMHG | WEIGHT: 167 LBS

## 2023-10-09 DIAGNOSIS — Z34.93 THIRD TRIMESTER PREGNANCY: Primary | ICD-10-CM

## 2023-10-09 PROCEDURE — 0502F SUBSEQUENT PRENATAL CARE: CPT | Performed by: OBSTETRICS & GYNECOLOGY

## 2023-10-09 NOTE — H&P (VIEW-ONLY)
BROOKE Fam Martin  : 1995  MRN: [id number]  CSN: [id number]    History and Physical    Subjective   Della Martin is a 28 y.o. year old  who present for surgery due to intrauterine pregnancy at 39+ weeks with desired primary  section..    Past Medical History:   Diagnosis Date    Seizure     As a child around age 13 after a fall, none since    Varicella      No past surgical history on file.  Social History    Tobacco Use      Smoking status: Never      Smokeless tobacco: Never    No current facility-administered medications for this encounter.    Current Outpatient Medications:     ferrous sulfate 325 (65 FE) MG tablet, Take 1 tablet by mouth Daily With Breakfast., Disp: 60 tablet, Rfl: 10    Prenatal Vit-Fe Fumarate-FA (PRENATAL VITAMINS PO), Take  by mouth., Disp: , Rfl:     Allergies   Allergen Reactions    Cephalosporins Rash    Albuterol Hives       Review of Systems   Constitutional: Negative.    HENT: Negative.     Respiratory: Negative.     Cardiovascular: Negative.          Objective   LMP 01/10/2023 (Exact Date)   General: well developed; well nourished  no acute distress   Heart: regular rate and rhythm, S1, S2 normal, no murmur, click, rub or gallop   Lungs: breathing is unlabored  clear to auscultation bilaterally   Abdomen: soft, non-tender; no masses  no umbilical or inguinal hernias are present  no hepato-splenomegaly   Pelvis:: External genitalia:  normal appearance of the external genitalia including Bartholin's and Howells's glands.   Labs  Lab Results   Component Value Date     2023    HGB 11.4 (L) 2023    HCT 33.3 (L) 2023    WBC 10.22 2023     2023    K 3.6 2023     2023    CO2 23.8 2023    BUN 8 2023    CREATININE 0.45 (L) 2023    GLUCOSE 99 2023    ALBUMIN 4.0 2023    CALCIUM 9.0 2023    AST 14 2023    ALT 10 2023    BILITOT 0.2 2023         Assessment   Intrauterine pregnancy at 39+ weeks  Desired primary  section     Plan   Primary C/S   2. Risks, complications, benefits, and other alternatives discussed.    Jaylen Kim MD  10/9/2023  09:21 EDT

## 2023-10-09 NOTE — PROGRESS NOTES
Chief Complaint   Patient presents with    Routine Prenatal Visit     Prenatal visit with no problems or concerns.        HPI:   , 38w6d gestation reports doing well    ROS:  See Prenatal Episode/Flowsheet  /64   Wt 75.8 kg (167 lb)   LMP 01/10/2023 (Exact Date)   BMI 29.58 kg/mý      EXAM:  EXTREMITIES:  No swelling-See Prenatal Episode/Flowsheet    ABDOMEN:  FHTs/Movement noted-See Prenatal Episode/Flowsheet    URINE GLUCOSE/PROTEIN:  See Prenatal Episode/Flowsheet    PELVIC EXAM:  See Prenatal Episode/Flowsheet  CV:  Lungs:  GYN:    MDM:    Lab Results   Component Value Date    HGB 11.4 (L) 2023    RUBELLAABIGG 1.72 2023    HEPBSAG Negative 2023    ABO O 2023    RH Positive 2023    ABSCRN Negative 2023    NJU8TMC6 Non Reactive 2023    HEPCVIRUSABY Non Reactive 2023    STREPGPB Negative 2023    URINECX Final report 2021       U/S:    1. IUP 38w6d  2. Routine care   3. C/S set for tomorrow   Subjective    Pt looks about the same as yesterday. D/w nurse who says pt had n/v w/ emesis around 10pm last night. Pt looks tired/al little drowsy but oriented x3. He denies pain in left leg or foot. No cp, no palpitations. +intermittent cough. Denies feeling lightheaded/dizzy. Says his breathing doesn't feel normal but denies \"shortness of breath.\" having bm's.    Objective     I/O's    Intake/Output Summary (Last 24 hours) at 12/17/2022 0851  Last data filed at 12/17/2022 0400  Gross per 24 hour   Intake 334 ml   Output 3155 ml   Net -2821 ml       Last Recorded Vitals  Blood pressure 116/54, pulse 78, temperature 99.3 °F (37.4 °C), temperature source Oral, resp. rate 18, height 5' 7\" (1.702 m), weight 87.8 kg (193 lb 9 oz), SpO2 93 %.  Body mass index is 30.32 kg/m².    Physical exam  General: NAD  Head: normocephalic  Eyes- b/l pupils equal in size  Ears, nose, throat: MM slightly moist  Neck: supple, no jvd  Chest: respirations non labored, right lung BS very diminished. Left lung crackles.  Cardiac: RRR  Abdomen: abd distended but soft (stable), nttp, +BS. Jacquie tube in place which is nttp.  Musculoskeletal: lle wrapped. Left foot edema and cool to touch w/ dusky discoloration proximal to toes. Left lower shin below ace wrap is erythematous  Neurologic: awake but tired. Oriented x 3  Vascular: rle +2 pitting edema lower shin. +1 dependent edema b/l upper thighs. right calf nttp. lle wrapped w/ ace bandage. Left foot edema, left foot and toes cool to touch w/ scattered black lesions. Dusky discoloration proximal to toes.  Skin: warm, dry, pale  Psychiatric: calm    Labs     Recent Results (from the past 24 hour(s))   GLUCOSE, BEDSIDE - POINT OF CARE    Collection Time: 12/16/22 12:07 PM   Result Value Ref Range    GLUCOSE, BEDSIDE - POINT OF CARE 287 (H) 70 - 99 mg/dL   GLUCOSE, BEDSIDE - POINT OF CARE    Collection Time: 12/16/22  5:18 PM   Result Value Ref Range    GLUCOSE, BEDSIDE - POINT OF CARE 315 (H) 70  - 99 mg/dL   GLUCOSE, BEDSIDE - POINT OF CARE    Collection Time: 12/16/22  9:02 PM   Result Value Ref Range    GLUCOSE, BEDSIDE - POINT OF CARE 330 (H) 70 - 99 mg/dL   Comprehensive Metabolic Panel    Collection Time: 12/17/22  4:56 AM   Result Value Ref Range    Fasting Status      Sodium 133 (L) 135 - 145 mmol/L    Potassium 3.5 3.4 - 5.1 mmol/L    Chloride 98 97 - 110 mmol/L    Carbon Dioxide 29 21 - 32 mmol/L    Anion Gap 10 7 - 19 mmol/L    Glucose 205 (H) 70 - 99 mg/dL    BUN 11 6 - 20 mg/dL    Creatinine 0.93 0.67 - 1.17 mg/dL    Glomerular Filtration Rate >90 >=60    BUN/ Creatinine Ratio 12 7 - 25    Calcium 7.6 (L) 8.4 - 10.2 mg/dL    Bilirubin, Total 0.4 0.2 - 1.0 mg/dL    GOT/AST 22 <=37 Units/L    GPT/ALT 27 <64 Units/L    Alkaline Phosphatase 209 (H) 45 - 117 Units/L    Albumin 1.2 (L) 3.6 - 5.1 g/dL    Protein, Total 5.3 (L) 6.4 - 8.2 g/dL    Globulin 4.1 (H) 2.0 - 4.0 g/dL    A/G Ratio 0.3 (L) 1.0 - 2.4   Phosphorus    Collection Time: 12/17/22  4:56 AM   Result Value Ref Range    Phosphorus 1.8 (L) 2.4 - 4.7 mg/dL   Magnesium    Collection Time: 12/17/22  4:56 AM   Result Value Ref Range    Magnesium 2.0 1.7 - 2.4 mg/dL   CBC with Automated Differential (performable only)    Collection Time: 12/17/22  4:56 AM   Result Value Ref Range    WBC 24.0 (H) 4.2 - 11.0 K/mcL    RBC 2.87 (L) 4.50 - 5.90 mil/mcL    HGB 8.3 (L) 13.0 - 17.0 g/dL    HCT 24.7 (L) 39.0 - 51.0 %    MCV 86.1 78.0 - 100.0 fl    MCH 28.9 26.0 - 34.0 pg    MCHC 33.6 32.0 - 36.5 g/dL    RDW-CV 14.6 11.0 - 15.0 %    RDW-SD 46.3 39.0 - 50.0 fL     (H) 140 - 450 K/mcL    NRBC 0 <=0 /100 WBC    Neutrophil, Percent 87 %    Lymphocytes, Percent 5 %    Mono, Percent 5 %    Eosinophils, Percent 1 %    Basophils, Percent 0 %    Immature Granulocytes 2 %    Absolute Neutrophils 21.0 (H) 1.8 - 7.7 K/mcL    Absolute Lymphocytes 1.2 1.0 - 4.0 K/mcL    Absolute Monocytes 1.3 (H) 0.3 - 0.9 K/mcL    Absolute Eosinophils  0.1 0.0 - 0.5 K/mcL     Absolute Basophils 0.1 0.0 - 0.3 K/mcL    Absolute Immmature Granulocytes 0.4 (H) 0.0 - 0.2 K/mcL   GLUCOSE, BEDSIDE - POINT OF CARE    Collection Time: 12/17/22  8:31 AM   Result Value Ref Range    GLUCOSE, BEDSIDE - POINT OF CARE 186 (H) 70 - 99 mg/dL       Imaging  CT CHEST ABDOMEN PELVIS WO CONTRAST    Result Date: 12/12/2022  EXAMINATION: CT CHEST ABDOMEN PELVIS WO CONTRAST CLINICAL INDICATION: pulmonaty infiltrates, eval for pyelonephritis, cholecycstitis and Small vs large bowel obstruction vs ileus COMPARISON: None. TECHNIQUE: Multiple contiguous axial sections of the chest, abdomen and pelvis were acquired without the use of intravenous contrast.  Coronal and sagittal reformatted images were also provided for interpretation. 3-D axial MIPS reformatted images were generated at the CT scanner, as more sensitive sequence for detection of pulmonary nodule. RADIATION MODULATION: Adjustment of the mA and/or kV was done according to the patient's size. FINDINGS:  Minimal inspissated or aspirated secretions in the central airways.  Small bilateral pleural effusions.  Areas of smooth interlobular septal thickening.  Bibasilar predominant groundglass nodular opacities and possible tree-in-bud opacities.  There is suggestion of mild bronchial wall thickening as well.  There may be a discrete derick-fissural nodule on the right measuring 0.4 cm (series 4, image 55).  No pneumothorax.  Mild cardiac enlargement.  Very small pericardial effusion.  The thoracic aorta and main pulmonary artery are normal in caliber. No enlarged mediastinal or axillary lymph nodes are identified.  Evaluation for hilar lymph node enlargement is limited by the lack of intravenous contrast.  Diffuse esophageal wall thickening.  The esophagus is patulous as well.  The thyroid gland does not appear enlarged.  A subcutaneous ovoid hypodensity in the midline of the back likely represents an epithelial inclusion or sebaceous cyst.  Evaluation of the  abdominal organs, lymph nodes and vasculature is limited by the lack of intravenous contrast.  No definitive focal hepatic lesion is identified.  Cholelithiasis.  No biliary ductal dilatation. The adrenal glands and spleen are unremarkable.  The pancreas is somewhat atrophic. No urinary tract calculi.  No hydronephrosis or hydroureter.  The urinary bladder is decompressed by an indwelling catheter which limits its evaluation.  The prostate gland is visualized.  Evaluation of the gastrointestinal tract is less sensitive without the use of enteric contrast.  No bowel obstruction is seen.  A normal appendix is likely identified.  No pneumatosis or free air.  Mild degree of mesenteric and body wall edema.  A small amount of nonspecific free fluid is seen in the pelvis as well.  No definitive suspicious lymph nodes.  The abdominal aorta is nonaneurysmal.  No destructive bone lesion.      1.    Small bilateral pleural effusions and suggestion of pulmonary edema. 2.   Scattered groundglass nodular opacities bilaterally are suggestive of an infectious and/or inflammatory pneumonitis.  There may be a component of bronchiolitis as well. 3.    Mild cardiomegaly and very small pericardial effusion. 4.   Diffuse esophageal wall thickening.  The esophagus is patulous as well.  Underlying esophagitis is considered.  Recommend correlation with upper endoscopy. 5.   Cholelithiasis. Electronically Signed by: BETTINA SONG MD Signed on: 12/12/2022 6:26 PM        Active Medications  Current Facility-Administered Medications   Medication Dose Route Frequency Provider Last Rate Last Admin   • sodium PHOSPHATE 30 mmol in sodium chloride 0.9 % 250 mL IVPB  30 mmol Intravenous Once Melissa Griggs MD       • potassium CHLORIDE (KLOR-CON M) tay ER tablet 40 mEq  40 mEq Oral Once Melissa Griggs MD       • furosemide (LASIX INJECT) injection 40 mg  40 mg Intravenous Daily Melissa Griggs MD       • sodium chloride 0.9%  infusion   Intravenous Continuous PRN Shauna Mcconnell CNP       • insulin glargine (LANTUS) injection 18 Units  18 Units Subcutaneous Nightly Melissa Griggs MD   18 Units at 12/16/22 2157   • insulin lispro (ADMELOG, HumaLOG) injection 3 Units  3 Units Subcutaneous TID  Melissa Griggs MD       • insulin lispro (ADMELOG,HumaLOG) - Correction Dose   Subcutaneous Nightly Melissa Griggs MD   3 Units at 12/16/22 2121   • enoxaparin (LOVENOX) injection 90 mg  1 mg/kg Subcutaneous Q12H Cyril Hyde MD   90 mg at 12/16/22 2121   • DAPTOmycin (CUBICIN) injection 440 mg  6 mg/kg (Adjusted) Intravenous Q24H Eugenia Garner MD   440 mg at 12/16/22 0807   • valACYclovir (VALTREX) tablet 1,000 mg  1,000 mg Oral 2 times per day Eugenia Garner MD   1,000 mg at 12/16/22 2121   • nystatin (MYCOSTATIN) powder   Topical 2 times per day Eugenia Garner MD   Given at 12/16/22 2122   • pantoprazole (PROTONIX) EC tablet 40 mg  40 mg Oral QAM AC Melissa Griggs MD   40 mg at 12/17/22 0534   • meropenem (MERREM) 1 g in sodium chloride 0.9 % 100 mL IVPB  1 g Intravenous Q8H Eugenia Garner MD 33.3 mL/hr at 12/17/22 0254 1 g at 12/17/22 0254   • insulin lispro (ADMELOG,HumaLOG) - Correction Dose   Subcutaneous TID LIONEL Griggs MD   4 Units at 12/16/22 1723   • prochlorperazine (COMPAZINE) injection 10 mg  10 mg Intravenous Q6H PRN Isabella Vaughn CNP   10 mg at 12/12/22 0611   • bisacodyl (DULCOLAX) suppository 10 mg  10 mg Rectal Daily PRN Joan Mayfield DO   10 mg at 12/11/22 1320   • dextrose 50 % injection 25 g  25 g Intravenous PRN Joan Mayfield DO       • dextrose 50 % injection 12.5 g  12.5 g Intravenous PRN Joan Mayfield DO   12.5 g at 12/14/22 1222   • glucagon (GLUCAGEN) injection 1 mg  1 mg Intramuscular PRN Joan Mayfield, DO       • dextrose (GLUTOSE) 40 % gel 15 g  15 g Oral PRN Joan Mayfield DO   15 g at 12/13/22 0644   • dextrose (GLUTOSE) 40 % gel 30 g  30 g Oral PRN Joan Mayfield, DO       • [Held  by provider] tamsulosin (FLOMAX) capsule 0.4 mg  0.4 mg Oral Daily PC Petros Flores PA-C   0.4 mg at 12/12/22 1001   • polyethylene glycol (MIRALAX) packet 17 g  17 g Oral Daily Joan Mayfield DO   17 g at 12/16/22 0806   • ondansetron (ZOFRAN) injection 4 mg  4 mg Intravenous 4x Daily PRN Tamera Aguirre MD   4 mg at 12/16/22 2216   • docusate sodium-sennosides (SENOKOT S) 50-8.6 MG 2 tablet  2 tablet Oral BID Wen DEBBY Crabtreeauss, CNP   2 tablet at 12/16/22 2121   • magnesium hydroxide (MILK OF MAGNESIA) 400 MG/5ML suspension 30 mL  30 mL Oral Nightly PRN Wen Crabtreeauss, CNP   30 mL at 12/09/22 2357   • Potassium Standard Replacement Protocol (Levels 3.5 and lower)   Does not apply See Admin Instructions Jake Wynn DO       • Magnesium Standard Replacement Protocol   Does not apply See Admin Instructions Jake Wynn DO       • Potassium Replacement (Levels 3.6 - 4)   Does not apply See Admin Instructions Jake Wynn DO       • Phosphorus Standard Replacement Protocol   Does not apply See Admin Instructions Jake Wynn DO       • influenza virus quadrivalent vaccine inactivated (FLUZONE HIGH-DOSE QUAD) injection 0.7 mL  0.7 mL Intramuscular PRN Jose Enrique Zuleta MD       • pneumococcal 20-valent vaccine (PREVNAR 20) injection 0.5 mL  0.5 mL Intramuscular PRN Jose Enrique Zuleta MD       • sodium chloride 0.9 % flush bag 25 mL  25 mL Intravenous PRN Katie Will MD       • sodium chloride (PF) 0.9 % injection 2 mL  2 mL Intracatheter 2 times per day Katie Will MD   2 mL at 12/16/22 2130   • sodium chloride 0.9% infusion   Intravenous Continuous PRN Katie Will MD 10 mL/hr at 12/16/22 0220 New Bag at 12/16/22 0220   • ** patient own medication stored in pharmacy. Return to patient at discharge**  1 each Oral See Admin Instructions Chao Graham MD       • nitroGLYCERIN topical (NITRO-BID) 2 % ointment 0.5 inch  0.5 inch Topical 4 times per day Katie Will MD   0.5 inch at 12/17/22 0196   •  acetaminophen (TYLENOL) tablet 650 mg  650 mg Oral Q6H Katie Will MD   650 mg at 12/17/22 0255   • [Held by provider] gabapentin (NEURONTIN) capsule 300 mg  300 mg Oral TID Katie Will MD   300 mg at 12/15/22 0929   • [Held by provider] HYDROcodone-acetaminophen (NORCO) 5-325 MG per tablet 1 tablet  1 tablet Oral Q4H PRN Katie Will MD   1 tablet at 12/15/22 1246   • [Held by provider] HYDROmorphone (DILAUDID) injection 0.4 mg  0.4 mg Intravenous Q4H PRN Katie Will MD   0.4 mg at 12/11/22 0349   GB US: 1.    Distended gallbladder with cholelithiasis and gallbladder sludge, but  no other sonographic signs for cholecystitis.  2.    Hepatomegaly.  No focal hepatic lesion.    Assessment and Plan  Persistent leukocytosis-expect due to lle infection and/or ischemia. Also had recent cholecystitis s/p samantha tube placed on 12-15-22  -afebrile  -Urinalysis negative  -cxr report w/ atelectasis or infiltrates  -ID following, con't abx per their recs.    Chronic cholecystitis vs biliary dyskinesia  -GB US Distended gallbladder with cholelithiasis and gallbladder sludge. HIDA scan w/ chronic cholecysitis.  Gen surg team consulted, they recommended IR samantha tube which was placed 12-15-22.  -F/u biliary drainage culture  -pt will need to f/u w/ gen surg as outpatient to determine definitive surgical plan/timing for GB     Acute nonocclusive thrombus of the distal left common iliac artery extending into the left external iliac and left internal iliac arteries, with additional occlusive thrombus in the left lower extremity which begins at the proximal to mid left popliteal artery.  -s/p Left lower extremity thromboembolectomy, left popliteal and trifurcation cutdown w/ arterial exposure, left lower extremity four compartment fasciotomy on 12/6/22   -ct leg 12-13-22 results were d/w vasc surg team, they think ct scan shows post op changes rather than infection  -vasc surg on consult, appreciate recs regarding management of lle,  wound care, activity recs, surgical plan/timing. S/p debridement today.  -con't abx treatment per ID team  -heme also following to work up etiology of arterial clot. Possible embolic event, but no cardiac source on WAYNE (12/9/22) . Pt will need hypercoaguable work up w/ hematology. con't fd lovenox per dr. hyde's recs. Plan to dc on Eliquis 5mg BID PO. Recommend change to eliquis closer to dc in case pt needs more procedures prior to dc.  -Follow up with Dr Hyde 2 weeks post DC (Eliquis coverage: $144/mo)   -Cardiology on consult, appreciate recs  -con't pain control    Acute encephalopathy on 12-15-22- better  -possibly due to IR procedure, medication side effect (stop norco and gabapentin)  -ct head no acute change, ammonia wnl, vbg co2 not elevated    Acute hypoxic resp failure  -con't supplemental o2 as necessary, wean off as able  -encourage IS and acapella (d/w nurse)  -monitor o2 requirements and volume status.     Acute anemia-suspect inflamm block  -no recent active bleeding seen  -monitor hb closely. Transfuse if hb <7. Required transfusion 12-16  -heme following: To start Retracrit 20K and IV sucrose 200 mg IV twice a week for at least one week.   -monitor hb closely     Uncontrolled Type 2 DM w/ hypoglycemia  -hypoglycemia resolved  -Holding home diabetes regimen while in hospital  -restart lantus and slowly titrate up  -con't lispro ssi, lispro 3 units w/ meals  -monitor accucheks closely  -hypoglycemia protocol  -a1c 12.3 12-6-22    hypophos-resolved  - monitor and replace lytes as needed     Rhabdomyolysis  - CK improving  - s/p ivf    Esophagitis seen on ct scan  -ppi  -ID started micafungin    Urinary retention s/p smart cath  -seen by urology    Elevated bnp, volume overload on exam  -ef normal  -stopped ivf, con't iv lasix  -con't to monitor volume status closely    Rue iv infiltration  -f/u duplex (prelim report neg for dvt)    Possible constipation- con't bowel regimen      Deconditioning- ok  to start pt/ot per vasc surg team    DVT PPX: fd lovenox per heme recs     Code Status: Full Resuscitation    PCP  Verify Pcp    Melissa Griggs MD-d/w nurse, Dr. Johnson, Dr. Parrish

## 2023-10-10 ENCOUNTER — ANESTHESIA EVENT (OUTPATIENT)
Dept: PERIOP | Facility: HOSPITAL | Age: 28
End: 2023-10-10
Payer: COMMERCIAL

## 2023-10-10 ENCOUNTER — HOSPITAL ENCOUNTER (INPATIENT)
Facility: HOSPITAL | Age: 28
LOS: 2 days | Discharge: HOME | End: 2023-10-12
Attending: OBSTETRICS & GYNECOLOGY | Admitting: OBSTETRICS & GYNECOLOGY
Payer: COMMERCIAL

## 2023-10-10 ENCOUNTER — ANESTHESIA (OUTPATIENT)
Dept: PERIOP | Facility: HOSPITAL | Age: 28
End: 2023-10-10
Payer: COMMERCIAL

## 2023-10-10 DIAGNOSIS — Z34.93 THIRD TRIMESTER PREGNANCY: ICD-10-CM

## 2023-10-10 DIAGNOSIS — Z98.891 S/P CESAREAN SECTION: Primary | ICD-10-CM

## 2023-10-10 PROBLEM — Z34.90 PREGNANCY: Status: ACTIVE | Noted: 2023-10-10

## 2023-10-10 LAB
ABO GROUP BLD: NORMAL
BACTERIA UR QL AUTO: ABNORMAL /HPF
BASOPHILS # BLD AUTO: 0.06 10*3/MM3 (ref 0–0.2)
BASOPHILS NFR BLD AUTO: 0.5 % (ref 0–1.5)
BILIRUB UR QL STRIP: NEGATIVE
BLD GP AB SCN SERPL QL: NEGATIVE
CLARITY UR: CLEAR
COLOR UR: YELLOW
DEPRECATED RDW RBC AUTO: 46.3 FL (ref 37–54)
EOSINOPHIL # BLD AUTO: 0.1 10*3/MM3 (ref 0–0.4)
EOSINOPHIL NFR BLD AUTO: 0.9 % (ref 0.3–6.2)
ERYTHROCYTE [DISTWIDTH] IN BLOOD BY AUTOMATED COUNT: 14 % (ref 12.3–15.4)
GLUCOSE UR STRIP-MCNC: NEGATIVE MG/DL
HCT VFR BLD AUTO: 34 % (ref 34–46.6)
HGB BLD-MCNC: 11.4 G/DL (ref 12–15.9)
HGB UR QL STRIP.AUTO: NEGATIVE
HYALINE CASTS UR QL AUTO: ABNORMAL /LPF
IMM GRANULOCYTES # BLD AUTO: 0.08 10*3/MM3 (ref 0–0.05)
IMM GRANULOCYTES NFR BLD AUTO: 0.7 % (ref 0–0.5)
KETONES UR QL STRIP: NEGATIVE
LEUKOCYTE ESTERASE UR QL STRIP.AUTO: ABNORMAL
LYMPHOCYTES # BLD AUTO: 2.1 10*3/MM3 (ref 0.7–3.1)
LYMPHOCYTES NFR BLD AUTO: 18.8 % (ref 19.6–45.3)
MCH RBC QN AUTO: 30.5 PG (ref 26.6–33)
MCHC RBC AUTO-ENTMCNC: 33.5 G/DL (ref 31.5–35.7)
MCV RBC AUTO: 90.9 FL (ref 79–97)
MONOCYTES # BLD AUTO: 0.86 10*3/MM3 (ref 0.1–0.9)
MONOCYTES NFR BLD AUTO: 7.7 % (ref 5–12)
NEUTROPHILS NFR BLD AUTO: 7.95 10*3/MM3 (ref 1.7–7)
NEUTROPHILS NFR BLD AUTO: 71.4 % (ref 42.7–76)
NITRITE UR QL STRIP: NEGATIVE
NRBC BLD AUTO-RTO: 0 /100 WBC (ref 0–0.2)
PH UR STRIP.AUTO: 6.5 [PH] (ref 5–8)
PLATELET # BLD AUTO: 233 10*3/MM3 (ref 140–450)
PMV BLD AUTO: 10.3 FL (ref 6–12)
PROT UR QL STRIP: NEGATIVE
RBC # BLD AUTO: 3.74 10*6/MM3 (ref 3.77–5.28)
RBC # UR STRIP: ABNORMAL /HPF
REF LAB TEST METHOD: ABNORMAL
RH BLD: POSITIVE
SP GR UR STRIP: 1.01 (ref 1–1.03)
SQUAMOUS #/AREA URNS HPF: ABNORMAL /HPF
T&S EXPIRATION DATE: NORMAL
UROBILINOGEN UR QL STRIP: ABNORMAL
WBC # UR STRIP: ABNORMAL /HPF
WBC NRBC COR # BLD: 11.15 10*3/MM3 (ref 3.4–10.8)

## 2023-10-10 PROCEDURE — 58611 LIGATE OVIDUCT(S) ADD-ON: CPT | Performed by: OBSTETRICS & GYNECOLOGY

## 2023-10-10 PROCEDURE — 25010000002 GENTAMICIN PER 80 MG: Performed by: OBSTETRICS & GYNECOLOGY

## 2023-10-10 PROCEDURE — 25010000002 CLINDAMYCIN 900 MG/50ML SOLUTION: Performed by: OBSTETRICS & GYNECOLOGY

## 2023-10-10 PROCEDURE — 25810000003 LACTATED RINGERS PER 1000 ML: Performed by: OBSTETRICS & GYNECOLOGY

## 2023-10-10 PROCEDURE — 25810000003 LACTATED RINGERS SOLUTION: Performed by: OBSTETRICS & GYNECOLOGY

## 2023-10-10 PROCEDURE — 25010000002 OXYTOCIN PER 10 UNITS: Performed by: NURSE ANESTHETIST, CERTIFIED REGISTERED

## 2023-10-10 PROCEDURE — 25010000002 ONDANSETRON PER 1 MG: Performed by: NURSE ANESTHETIST, CERTIFIED REGISTERED

## 2023-10-10 PROCEDURE — 59510 CESAREAN DELIVERY: CPT | Performed by: OBSTETRICS & GYNECOLOGY

## 2023-10-10 PROCEDURE — 86850 RBC ANTIBODY SCREEN: CPT | Performed by: OBSTETRICS & GYNECOLOGY

## 2023-10-10 PROCEDURE — 81001 URINALYSIS AUTO W/SCOPE: CPT | Performed by: OBSTETRICS & GYNECOLOGY

## 2023-10-10 PROCEDURE — 25010000002 MORPHINE PER 10 MG: Performed by: NURSE ANESTHETIST, CERTIFIED REGISTERED

## 2023-10-10 PROCEDURE — 86901 BLOOD TYPING SEROLOGIC RH(D): CPT | Performed by: OBSTETRICS & GYNECOLOGY

## 2023-10-10 PROCEDURE — 85025 COMPLETE CBC W/AUTO DIFF WBC: CPT | Performed by: OBSTETRICS & GYNECOLOGY

## 2023-10-10 PROCEDURE — 51703 INSERT BLADDER CATH COMPLEX: CPT

## 2023-10-10 PROCEDURE — 0UL70CZ OCCLUSION OF BILATERAL FALLOPIAN TUBES WITH EXTRALUMINAL DEVICE, OPEN APPROACH: ICD-10-PCS | Performed by: OBSTETRICS & GYNECOLOGY

## 2023-10-10 PROCEDURE — 25010000002 FENTANYL CITRATE (PF) 100 MCG/2ML SOLUTION: Performed by: NURSE ANESTHETIST, CERTIFIED REGISTERED

## 2023-10-10 PROCEDURE — 25810000003 DEXTROSE-NACL PER 500 ML: Performed by: OBSTETRICS & GYNECOLOGY

## 2023-10-10 PROCEDURE — 86900 BLOOD TYPING SEROLOGIC ABO: CPT | Performed by: OBSTETRICS & GYNECOLOGY

## 2023-10-10 PROCEDURE — 25010000002 KETOROLAC TROMETHAMINE PER 15 MG: Performed by: OBSTETRICS & GYNECOLOGY

## 2023-10-10 PROCEDURE — 87086 URINE CULTURE/COLONY COUNT: CPT | Performed by: OBSTETRICS & GYNECOLOGY

## 2023-10-10 PROCEDURE — 59025 FETAL NON-STRESS TEST: CPT

## 2023-10-10 DEVICE — CLIP FALLOP FILSHIE TI PR STRL BX/10: Type: IMPLANTABLE DEVICE | Site: ABDOMEN | Status: FUNCTIONAL

## 2023-10-10 RX ORDER — BUPIVACAINE HYDROCHLORIDE 7.5 MG/ML
INJECTION, SOLUTION INTRASPINAL AS NEEDED
Status: DISCONTINUED | OUTPATIENT
Start: 2023-10-10 | End: 2023-10-10 | Stop reason: SURG

## 2023-10-10 RX ORDER — EPHEDRINE SULFATE 5 MG/ML
INJECTION INTRAVENOUS AS NEEDED
Status: DISCONTINUED | OUTPATIENT
Start: 2023-10-10 | End: 2023-10-10 | Stop reason: SURG

## 2023-10-10 RX ORDER — SIMETHICONE 80 MG
80 TABLET,CHEWABLE ORAL 4 TIMES DAILY PRN
Status: DISCONTINUED | OUTPATIENT
Start: 2023-10-10 | End: 2023-10-12 | Stop reason: HOSPADM

## 2023-10-10 RX ORDER — OXYCODONE HYDROCHLORIDE 5 MG/1
10 TABLET ORAL EVERY 4 HOURS PRN
Status: DISCONTINUED | OUTPATIENT
Start: 2023-10-10 | End: 2023-10-12 | Stop reason: HOSPADM

## 2023-10-10 RX ORDER — ACETAMINOPHEN 500 MG
1000 TABLET ORAL ONCE
Status: DISCONTINUED | OUTPATIENT
Start: 2023-10-10 | End: 2023-10-10 | Stop reason: HOSPADM

## 2023-10-10 RX ORDER — MORPHINE SULFATE 2 MG/ML
2 INJECTION, SOLUTION INTRAMUSCULAR; INTRAVENOUS EVERY 4 HOURS PRN
Status: DISCONTINUED | OUTPATIENT
Start: 2023-10-10 | End: 2023-10-12 | Stop reason: HOSPADM

## 2023-10-10 RX ORDER — CITRIC ACID/SODIUM CITRATE 334-500MG
30 SOLUTION, ORAL ORAL ONCE
Status: COMPLETED | OUTPATIENT
Start: 2023-10-10 | End: 2023-10-10

## 2023-10-10 RX ORDER — OXYTOCIN 10 [USP'U]/ML
INJECTION, SOLUTION INTRAMUSCULAR; INTRAVENOUS AS NEEDED
Status: DISCONTINUED | OUTPATIENT
Start: 2023-10-10 | End: 2023-10-10 | Stop reason: SURG

## 2023-10-10 RX ORDER — OXYTOCIN/0.9 % SODIUM CHLORIDE 30/500 ML
125 PLASTIC BAG, INJECTION (ML) INTRAVENOUS CONTINUOUS PRN
Status: DISCONTINUED | OUTPATIENT
Start: 2023-10-10 | End: 2023-10-12 | Stop reason: HOSPADM

## 2023-10-10 RX ORDER — SODIUM CHLORIDE 9 MG/ML
40 INJECTION, SOLUTION INTRAVENOUS AS NEEDED
Status: DISCONTINUED | OUTPATIENT
Start: 2023-10-10 | End: 2023-10-10 | Stop reason: HOSPADM

## 2023-10-10 RX ORDER — MISOPROSTOL 200 UG/1
800 TABLET ORAL AS NEEDED
Status: DISCONTINUED | OUTPATIENT
Start: 2023-10-10 | End: 2023-10-12 | Stop reason: HOSPADM

## 2023-10-10 RX ORDER — CALCIUM CARBONATE 500 MG/1
1 TABLET, CHEWABLE ORAL EVERY 4 HOURS PRN
Status: DISCONTINUED | OUTPATIENT
Start: 2023-10-10 | End: 2023-10-12 | Stop reason: HOSPADM

## 2023-10-10 RX ORDER — ACETAMINOPHEN 325 MG/1
650 TABLET ORAL EVERY 6 HOURS
Status: DISCONTINUED | OUTPATIENT
Start: 2023-10-11 | End: 2023-10-11

## 2023-10-10 RX ORDER — OXYTOCIN/0.9 % SODIUM CHLORIDE 30/500 ML
42 PLASTIC BAG, INJECTION (ML) INTRAVENOUS AS NEEDED
Status: ACTIVE | OUTPATIENT
Start: 2023-10-10 | End: 2023-10-11

## 2023-10-10 RX ORDER — FAMOTIDINE 10 MG/ML
20 INJECTION, SOLUTION INTRAVENOUS ONCE
Status: COMPLETED | OUTPATIENT
Start: 2023-10-10 | End: 2023-10-10

## 2023-10-10 RX ORDER — MORPHINE SULFATE 1 MG/ML
INJECTION, SOLUTION EPIDURAL; INTRATHECAL; INTRAVENOUS AS NEEDED
Status: DISCONTINUED | OUTPATIENT
Start: 2023-10-10 | End: 2023-10-10 | Stop reason: SURG

## 2023-10-10 RX ORDER — NALOXONE HCL 0.4 MG/ML
0.4 VIAL (ML) INJECTION
Status: DISCONTINUED | OUTPATIENT
Start: 2023-10-10 | End: 2023-10-10

## 2023-10-10 RX ORDER — SODIUM CHLORIDE, SODIUM LACTATE, POTASSIUM CHLORIDE, CALCIUM CHLORIDE 600; 310; 30; 20 MG/100ML; MG/100ML; MG/100ML; MG/100ML
125 INJECTION, SOLUTION INTRAVENOUS CONTINUOUS
Status: DISCONTINUED | OUTPATIENT
Start: 2023-10-10 | End: 2023-10-10

## 2023-10-10 RX ORDER — ONDANSETRON 2 MG/ML
4 INJECTION INTRAMUSCULAR; INTRAVENOUS EVERY 6 HOURS PRN
Status: DISCONTINUED | OUTPATIENT
Start: 2023-10-10 | End: 2023-10-12 | Stop reason: HOSPADM

## 2023-10-10 RX ORDER — SODIUM CHLORIDE 0.9 % (FLUSH) 0.9 %
10 SYRINGE (ML) INJECTION EVERY 12 HOURS SCHEDULED
Status: DISCONTINUED | OUTPATIENT
Start: 2023-10-10 | End: 2023-10-10 | Stop reason: HOSPADM

## 2023-10-10 RX ORDER — OXYTOCIN/0.9 % SODIUM CHLORIDE 30/500 ML
333 PLASTIC BAG, INJECTION (ML) INTRAVENOUS ONCE
Status: DISCONTINUED | OUTPATIENT
Start: 2023-10-10 | End: 2023-10-12 | Stop reason: HOSPADM

## 2023-10-10 RX ORDER — ONDANSETRON 2 MG/ML
4 INJECTION INTRAMUSCULAR; INTRAVENOUS ONCE AS NEEDED
Status: DISCONTINUED | OUTPATIENT
Start: 2023-10-10 | End: 2023-10-10

## 2023-10-10 RX ORDER — DEXTROSE AND SODIUM CHLORIDE 5; .9 G/100ML; G/100ML
125 INJECTION, SOLUTION INTRAVENOUS CONTINUOUS
Status: DISCONTINUED | OUTPATIENT
Start: 2023-10-10 | End: 2023-10-12 | Stop reason: HOSPADM

## 2023-10-10 RX ORDER — NALOXONE HCL 0.4 MG/ML
0.4 VIAL (ML) INJECTION
Status: DISCONTINUED | OUTPATIENT
Start: 2023-10-10 | End: 2023-10-12 | Stop reason: HOSPADM

## 2023-10-10 RX ORDER — IBUPROFEN 800 MG/1
800 TABLET ORAL EVERY 6 HOURS
Status: DISCONTINUED | OUTPATIENT
Start: 2023-10-10 | End: 2023-10-11

## 2023-10-10 RX ORDER — ALUMINA, MAGNESIA, AND SIMETHICONE 2400; 2400; 240 MG/30ML; MG/30ML; MG/30ML
15 SUSPENSION ORAL EVERY 4 HOURS PRN
Status: DISCONTINUED | OUTPATIENT
Start: 2023-10-10 | End: 2023-10-12 | Stop reason: HOSPADM

## 2023-10-10 RX ORDER — METHYLERGONOVINE MALEATE 0.2 MG/ML
200 INJECTION INTRAVENOUS ONCE AS NEEDED
Status: DISCONTINUED | OUTPATIENT
Start: 2023-10-10 | End: 2023-10-12 | Stop reason: HOSPADM

## 2023-10-10 RX ORDER — ACETAMINOPHEN 500 MG
1000 TABLET ORAL EVERY 6 HOURS
Status: DISCONTINUED | OUTPATIENT
Start: 2023-10-10 | End: 2023-10-11

## 2023-10-10 RX ORDER — PRENATAL VIT/IRON FUM/FOLIC AC 27MG-0.8MG
1 TABLET ORAL DAILY
Status: DISCONTINUED | OUTPATIENT
Start: 2023-10-11 | End: 2023-10-12 | Stop reason: HOSPADM

## 2023-10-10 RX ORDER — LIDOCAINE HYDROCHLORIDE 10 MG/ML
0.5 INJECTION, SOLUTION INFILTRATION; PERINEURAL ONCE AS NEEDED
Status: DISCONTINUED | OUTPATIENT
Start: 2023-10-10 | End: 2023-10-10 | Stop reason: HOSPADM

## 2023-10-10 RX ORDER — DIPHENHYDRAMINE HCL 25 MG
25 CAPSULE ORAL EVERY 4 HOURS PRN
Status: DISCONTINUED | OUTPATIENT
Start: 2023-10-10 | End: 2023-10-12 | Stop reason: HOSPADM

## 2023-10-10 RX ORDER — SODIUM CHLORIDE 0.9 % (FLUSH) 0.9 %
10 SYRINGE (ML) INJECTION AS NEEDED
Status: DISCONTINUED | OUTPATIENT
Start: 2023-10-10 | End: 2023-10-10 | Stop reason: HOSPADM

## 2023-10-10 RX ORDER — NALBUPHINE HYDROCHLORIDE 10 MG/ML
2.5 INJECTION, SOLUTION INTRAMUSCULAR; INTRAVENOUS; SUBCUTANEOUS EVERY 4 HOURS PRN
Status: DISCONTINUED | OUTPATIENT
Start: 2023-10-10 | End: 2023-10-10

## 2023-10-10 RX ORDER — MAGNESIUM HYDROXIDE 1200 MG/15ML
LIQUID ORAL AS NEEDED
Status: DISCONTINUED | OUTPATIENT
Start: 2023-10-10 | End: 2023-10-10 | Stop reason: HOSPADM

## 2023-10-10 RX ORDER — OXYCODONE HYDROCHLORIDE 5 MG/1
5 TABLET ORAL EVERY 4 HOURS PRN
Status: DISCONTINUED | OUTPATIENT
Start: 2023-10-10 | End: 2023-10-12 | Stop reason: HOSPADM

## 2023-10-10 RX ORDER — DOCUSATE SODIUM 100 MG/1
100 CAPSULE, LIQUID FILLED ORAL 2 TIMES DAILY PRN
Status: DISCONTINUED | OUTPATIENT
Start: 2023-10-10 | End: 2023-10-12 | Stop reason: HOSPADM

## 2023-10-10 RX ORDER — CLINDAMYCIN PHOSPHATE 900 MG/50ML
900 INJECTION INTRAVENOUS ONCE
Status: COMPLETED | OUTPATIENT
Start: 2023-10-10 | End: 2023-10-10

## 2023-10-10 RX ORDER — KETOROLAC TROMETHAMINE 30 MG/ML
30 INJECTION, SOLUTION INTRAMUSCULAR; INTRAVENOUS ONCE
Status: COMPLETED | OUTPATIENT
Start: 2023-10-10 | End: 2023-10-10

## 2023-10-10 RX ORDER — CARBOPROST TROMETHAMINE 250 UG/ML
250 INJECTION, SOLUTION INTRAMUSCULAR AS NEEDED
Status: DISCONTINUED | OUTPATIENT
Start: 2023-10-10 | End: 2023-10-12 | Stop reason: HOSPADM

## 2023-10-10 RX ORDER — ONDANSETRON 2 MG/ML
INJECTION INTRAMUSCULAR; INTRAVENOUS AS NEEDED
Status: DISCONTINUED | OUTPATIENT
Start: 2023-10-10 | End: 2023-10-10 | Stop reason: SURG

## 2023-10-10 RX ORDER — FENTANYL CITRATE 50 UG/ML
INJECTION, SOLUTION INTRAMUSCULAR; INTRAVENOUS AS NEEDED
Status: DISCONTINUED | OUTPATIENT
Start: 2023-10-10 | End: 2023-10-10 | Stop reason: SURG

## 2023-10-10 RX ADMIN — SODIUM CHLORIDE, POTASSIUM CHLORIDE, SODIUM LACTATE AND CALCIUM CHLORIDE 125 ML/HR: 600; 310; 30; 20 INJECTION, SOLUTION INTRAVENOUS at 10:50

## 2023-10-10 RX ADMIN — SODIUM CITRATE AND CITRIC ACID MONOHYDRATE 30 ML: 500; 334 SOLUTION ORAL at 12:04

## 2023-10-10 RX ADMIN — OXYCODONE HYDROCHLORIDE 10 MG: 5 TABLET ORAL at 20:21

## 2023-10-10 RX ADMIN — BUPIVACAINE HYDROCHLORIDE IN DEXTROSE 1.6 ML: 7.5 INJECTION, SOLUTION SUBARACHNOID at 12:35

## 2023-10-10 RX ADMIN — EPHEDRINE SULFATE 10 MG: 5 INJECTION INTRAVENOUS at 12:44

## 2023-10-10 RX ADMIN — CLINDAMYCIN PHOSPHATE 900 MG: 900 INJECTION, SOLUTION INTRAVENOUS at 11:14

## 2023-10-10 RX ADMIN — MORPHINE SULFATE 0.15 MG: 1 INJECTION EPIDURAL; INTRATHECAL; INTRAVENOUS at 12:35

## 2023-10-10 RX ADMIN — FENTANYL CITRATE 10 MCG: 50 INJECTION INTRAMUSCULAR; INTRAVENOUS at 12:35

## 2023-10-10 RX ADMIN — ACETAMINOPHEN 1000 MG: 500 TABLET, FILM COATED ORAL at 19:40

## 2023-10-10 RX ADMIN — KETOROLAC TROMETHAMINE 30 MG: 30 INJECTION, SOLUTION INTRAMUSCULAR; INTRAVENOUS at 16:27

## 2023-10-10 RX ADMIN — GENTAMICIN SULFATE 300 MG: 40 INJECTION, SOLUTION INTRAMUSCULAR; INTRAVENOUS at 11:37

## 2023-10-10 RX ADMIN — SODIUM CHLORIDE, POTASSIUM CHLORIDE, SODIUM LACTATE AND CALCIUM CHLORIDE 2000 ML: 600; 310; 30; 20 INJECTION, SOLUTION INTRAVENOUS at 11:30

## 2023-10-10 RX ADMIN — OXYTOCIN 20 UNITS: 10 INJECTION INTRAVENOUS at 12:55

## 2023-10-10 RX ADMIN — ONDANSETRON 4 MG: 2 INJECTION INTRAMUSCULAR; INTRAVENOUS at 12:34

## 2023-10-10 RX ADMIN — OXYTOCIN 20 UNITS: 10 INJECTION INTRAVENOUS at 13:26

## 2023-10-10 RX ADMIN — FAMOTIDINE 20 MG: 10 INJECTION INTRAVENOUS at 12:09

## 2023-10-10 RX ADMIN — SODIUM CHLORIDE, POTASSIUM CHLORIDE, SODIUM LACTATE AND CALCIUM CHLORIDE: 600; 310; 30; 20 INJECTION, SOLUTION INTRAVENOUS at 12:44

## 2023-10-10 RX ADMIN — DEXTROSE AND SODIUM CHLORIDE 125 ML/HR: 5; 900 INJECTION, SOLUTION INTRAVENOUS at 15:55

## 2023-10-10 NOTE — ANESTHESIA PREPROCEDURE EVALUATION
Anesthesia Evaluation     Patient summary reviewed and Nursing notes reviewed   no history of anesthetic complications:   NPO Solid Status: > 8 hours  NPO Liquid Status: > 8 hours           Airway   Mallampati: II  TM distance: >3 FB  Neck ROM: full  No difficulty expected  Dental - normal exam     Pulmonary - negative pulmonary ROS and normal exam   Cardiovascular - negative cardio ROS and normal exam  Exercise tolerance: good (4-7 METS)        Neuro/Psych  (+) seizures, psychiatric history Anxiety  GI/Hepatic/Renal/Endo - negative ROS     Musculoskeletal (-) negative ROS    Abdominal    Substance History - negative use     OB/GYN    (+) Pregnant        Other        ROS/Med Hx Other: Plts - 233                Anesthesia Plan    ASA 2     spinal     (Risks of spinal anesthesia discussed , including but not limited to:  Headache, itching, nausea and vomiting, infection, failure of the block, decreased BP, permanent chronic back pain, nerve damage, paralysis, etc.  )    Anesthetic plan, risks, benefits, and alternatives have been provided, discussed and informed consent has been obtained with: patient.  Pre-procedure education provided    CODE STATUS:

## 2023-10-10 NOTE — ANESTHESIA PROCEDURE NOTES
Spinal Block      Patient location during procedure: OR  Indication:at surgeon's request, post-op pain management and procedure for pain  Performed By  CRNA/CAA: Homar Brown CRNA  Preanesthetic Checklist  Completed: patient identified, IV checked, site marked, risks and benefits discussed, surgical consent, monitors and equipment checked, pre-op evaluation and timeout performed  Spinal Block Prep:  Patient Position:sitting  Sterile Tech:cap, gloves, mask and sterile barriers  Prep:Chloraprep  Patient Monitoring:blood pressure monitoring, continuous pulse oximetry and EKG    Spinal Block Procedure  Approach:midline  Guidance:landmark technique and palpation technique  Location:L3-L4  Needle Type:Pencan  Needle Gauge:25 G  Placement of Spinal needle event:cerebrospinal fluid aspirated    Fluid Appearance:clear     Post Assessment  Patient Tolerance:patient tolerated the procedure well with no apparent complications  Complications no  Additional Notes  Risks discussed , including but not limited to:  Headache, itching, n&v, infection, failure, decreased bp. Permanent chronic back pain, nerve damage, paralysis, etc.

## 2023-10-10 NOTE — ANESTHESIA POSTPROCEDURE EVALUATION
Patient: Della Martin    Procedure Summary       Date: 10/10/23 Room / Location: [facility] OR 1 /  [facility]    Anesthesia Start: 1224 Anesthesia Stop: 1339    Procedure:  SECTION PRIMARY WITH BILATERAL TUBAL LIGATION (Abdomen) Diagnosis:       Third trimester pregnancy      (Third trimester pregnancy [Z34.93])    Surgeons: Jaylen Kim MD Provider: Homar Brown CRNA    Anesthesia Type: spinal ASA Status: 2            Anesthesia Type: spinal    Vitals  /54  HR 85  Sat 98  Temp 97.6  Resp 12        Post Anesthesia Care and Evaluation    Patient location during evaluation: floor (OB)  Patient participation: complete - patient participated  Level of consciousness: awake and alert  Pain management: satisfactory to patient    Airway patency: patent  Anesthetic complications: No anesthetic complications    Cardiovascular status: acceptable and stable  Respiratory status: acceptable and room air  Hydration status: acceptable

## 2023-10-10 NOTE — NON STRESS TEST
Della Martin, a  at 39w0d with an MINH of 10/17/2023, by Last Menstrual Period, was seen at [facility] for a nonstress test.    Chief Complaint   Patient presents with    Scheduled        Patient Active Problem List   Diagnosis    Herpes zoster without complication    Trauma in childhood    Generalized anxiety disorder    Third trimester pregnancy    Pregnancy       Start Time: 1020  Stop Time: 1157    Patient has scheduled Primary  Section - Elective with Bilateral Tubal Ligation.     Interpretation A  Nonstress Test Interpretation A: Reactive

## 2023-10-11 LAB
BASOPHILS # BLD AUTO: 0.06 10*3/MM3 (ref 0–0.2)
BASOPHILS NFR BLD AUTO: 0.5 % (ref 0–1.5)
DEPRECATED RDW RBC AUTO: 47.5 FL (ref 37–54)
EOSINOPHIL # BLD AUTO: 0.11 10*3/MM3 (ref 0–0.4)
EOSINOPHIL NFR BLD AUTO: 0.9 % (ref 0.3–6.2)
ERYTHROCYTE [DISTWIDTH] IN BLOOD BY AUTOMATED COUNT: 14.1 % (ref 12.3–15.4)
HCT VFR BLD AUTO: 32.9 % (ref 34–46.6)
HGB BLD-MCNC: 10.6 G/DL (ref 12–15.9)
IMM GRANULOCYTES # BLD AUTO: 0.05 10*3/MM3 (ref 0–0.05)
IMM GRANULOCYTES NFR BLD AUTO: 0.4 % (ref 0–0.5)
LYMPHOCYTES # BLD AUTO: 2.83 10*3/MM3 (ref 0.7–3.1)
LYMPHOCYTES NFR BLD AUTO: 24 % (ref 19.6–45.3)
MCH RBC QN AUTO: 30.1 PG (ref 26.6–33)
MCHC RBC AUTO-ENTMCNC: 32.2 G/DL (ref 31.5–35.7)
MCV RBC AUTO: 93.5 FL (ref 79–97)
MONOCYTES # BLD AUTO: 0.76 10*3/MM3 (ref 0.1–0.9)
MONOCYTES NFR BLD AUTO: 6.5 % (ref 5–12)
NEUTROPHILS NFR BLD AUTO: 67.7 % (ref 42.7–76)
NEUTROPHILS NFR BLD AUTO: 7.97 10*3/MM3 (ref 1.7–7)
NRBC BLD AUTO-RTO: 0 /100 WBC (ref 0–0.2)
PLATELET # BLD AUTO: 221 10*3/MM3 (ref 140–450)
PMV BLD AUTO: 10.6 FL (ref 6–12)
RBC # BLD AUTO: 3.52 10*6/MM3 (ref 3.77–5.28)
WBC NRBC COR # BLD: 11.78 10*3/MM3 (ref 3.4–10.8)

## 2023-10-11 PROCEDURE — 90471 IMMUNIZATION ADMIN: CPT | Performed by: OBSTETRICS & GYNECOLOGY

## 2023-10-11 PROCEDURE — 25010000002 TETANUS-DIPHTH-ACELL PERTUSSIS 5-2.5-18.5 LF-MCG/0.5 SUSPENSION PREFILLED SYRINGE: Performed by: OBSTETRICS & GYNECOLOGY

## 2023-10-11 PROCEDURE — 90715 TDAP VACCINE 7 YRS/> IM: CPT | Performed by: OBSTETRICS & GYNECOLOGY

## 2023-10-11 PROCEDURE — 85025 COMPLETE CBC W/AUTO DIFF WBC: CPT | Performed by: OBSTETRICS & GYNECOLOGY

## 2023-10-11 RX ORDER — FERROUS SULFATE TAB EC 324 MG (65 MG FE EQUIVALENT) 324 (65 FE) MG
324 TABLET DELAYED RESPONSE ORAL 2 TIMES DAILY WITH MEALS
Status: DISCONTINUED | OUTPATIENT
Start: 2023-10-11 | End: 2023-10-12 | Stop reason: HOSPADM

## 2023-10-11 RX ORDER — IBUPROFEN 800 MG/1
800 TABLET ORAL EVERY 8 HOURS
Status: DISCONTINUED | OUTPATIENT
Start: 2023-10-11 | End: 2023-10-12 | Stop reason: HOSPADM

## 2023-10-11 RX ORDER — ACETAMINOPHEN 500 MG
1000 TABLET ORAL EVERY 8 HOURS
Status: DISCONTINUED | OUTPATIENT
Start: 2023-10-11 | End: 2023-10-12 | Stop reason: HOSPADM

## 2023-10-11 RX ADMIN — DOCUSATE SODIUM 100 MG: 100 CAPSULE, LIQUID FILLED ORAL at 09:02

## 2023-10-11 RX ADMIN — ACETAMINOPHEN 1000 MG: 500 TABLET, FILM COATED ORAL at 09:02

## 2023-10-11 RX ADMIN — OXYCODONE HYDROCHLORIDE 10 MG: 5 TABLET ORAL at 23:59

## 2023-10-11 RX ADMIN — SIMETHICONE 80 MG: 80 TABLET, CHEWABLE ORAL at 23:59

## 2023-10-11 RX ADMIN — FERROUS SULFATE TAB EC 324 MG (65 MG FE EQUIVALENT) 324 MG: 324 (65 FE) TABLET DELAYED RESPONSE at 17:57

## 2023-10-11 RX ADMIN — TETANUS TOXOID, REDUCED DIPHTHERIA TOXOID AND ACELLULAR PERTUSSIS VACCINE, ADSORBED 0.5 ML: 5; 2.5; 8; 8; 2.5 SUSPENSION INTRAMUSCULAR at 09:02

## 2023-10-11 RX ADMIN — FERROUS SULFATE TAB EC 324 MG (65 MG FE EQUIVALENT) 324 MG: 324 (65 FE) TABLET DELAYED RESPONSE at 09:02

## 2023-10-11 RX ADMIN — OXYCODONE HYDROCHLORIDE 10 MG: 5 TABLET ORAL at 10:20

## 2023-10-11 RX ADMIN — OXYCODONE HYDROCHLORIDE 10 MG: 5 TABLET ORAL at 05:48

## 2023-10-11 RX ADMIN — ACETAMINOPHEN 1000 MG: 500 TABLET, FILM COATED ORAL at 01:29

## 2023-10-11 RX ADMIN — SIMETHICONE 80 MG: 80 TABLET, CHEWABLE ORAL at 10:23

## 2023-10-11 RX ADMIN — IBUPROFEN 800 MG: 800 TABLET ORAL at 20:47

## 2023-10-11 RX ADMIN — ACETAMINOPHEN 1000 MG: 500 TABLET, FILM COATED ORAL at 17:57

## 2023-10-11 RX ADMIN — IBUPROFEN 800 MG: 800 TABLET ORAL at 12:52

## 2023-10-11 RX ADMIN — PRENATAL VITAMINS-IRON FUMARATE 27 MG IRON-FOLIC ACID 0.8 MG TABLET 1 TABLET: at 09:02

## 2023-10-11 RX ADMIN — OXYCODONE HYDROCHLORIDE 10 MG: 5 TABLET ORAL at 16:19

## 2023-10-11 NOTE — PROGRESS NOTES
BROOKE Otto   PROGRESS NOTE    Post-Op Day 1 S/P  and tubal ligation   Subjective   Subjective  Patient reports:  Pain is well controlled. She is is ambulating. Tolerating diet. Voiding - without difficulty; flatus reported..  Vaginal bleeding is as much as expected.    Objective    Objective     Vitals: Vital Signs Range for the last 24 hours  Temperature: Temp:  [97.2 øF (36.2 øC)-98.1 øF (36.7 øC)] 97.9 øF (36.6 øC)   Temp Source: Temp src: Oral   BP: BP: ()/(36-80) 100/66   Pulse: Heart Rate:  [76-97] 88   Respirations: Resp:  [16-18] 16   SPO2: SpO2:  [93 %-100 %] 97 %   O2 Amount (l/min):     O2 Devices Device (Oxygen Therapy): room air   Weight: Weight:  [76.3 kg (168 lb 3.2 oz)] 76.3 kg (168 lb 3.2 oz)            Physical Exam    Lungs Respirations even and unlabored    Abdomen Soft, non-tender,    Incision  dressed   Extremities extremities normal, atraumatic, no cyanosis or edema     I reviewed the patient's new clinical results. Hgb 10.6 Hct 32.9    Assessment & Plan        Third trimester pregnancy    Pregnancy    Assessment & Plan    Assessment:    Della Martin is Day 1  post-partum  , Low Transverse    with tubal ligation    Anemia    Plan:  continue post op care. Iron supplementation      Nguyen Landrum PA-C  10/11/23  08:06 EDT

## 2023-10-11 NOTE — PLAN OF CARE
Goal Outcome Evaluation:  Plan of Care Reviewed With: patient           Outcome Evaluation: VSS. I & O adequate. Tolerating regular diet well. Patient ambulating in hallway this shift with no adverse symptoms. Patient reports incisional soreness that is relieved with oral medications. Bottlefeeding going well. Positive bonding observed with .

## 2023-10-11 NOTE — PLAN OF CARE
Goal Outcome Evaluation:  Plan of Care Reviewed With: patient        Progress: improving  Outcome Evaluation: VSS, adequate pain relief,bottlefeeding, + bonding,continue with routine post op postpartum care

## 2023-10-12 VITALS
OXYGEN SATURATION: 98 % | SYSTOLIC BLOOD PRESSURE: 103 MMHG | TEMPERATURE: 98.5 F | DIASTOLIC BLOOD PRESSURE: 68 MMHG | RESPIRATION RATE: 16 BRPM | HEART RATE: 89 BPM | BODY MASS INDEX: 29.8 KG/M2 | WEIGHT: 168.2 LBS | HEIGHT: 63 IN

## 2023-10-12 PROBLEM — Z98.891 S/P CESAREAN SECTION: Status: ACTIVE | Noted: 2023-10-12

## 2023-10-12 LAB — BACTERIA SPEC AEROBE CULT: NO GROWTH

## 2023-10-12 RX ORDER — OXYCODONE HYDROCHLORIDE 5 MG/1
5 TABLET ORAL EVERY 6 HOURS PRN
Qty: 12 TABLET | Refills: 0 | Status: SHIPPED | OUTPATIENT
Start: 2023-10-12 | End: 2023-10-17

## 2023-10-12 RX ORDER — IBUPROFEN 800 MG/1
800 TABLET ORAL EVERY 8 HOURS PRN
Qty: 60 TABLET | Refills: 0 | Status: SHIPPED | OUTPATIENT
Start: 2023-10-12

## 2023-10-12 RX ORDER — ACETAMINOPHEN 500 MG
1000 TABLET ORAL EVERY 8 HOURS PRN
Qty: 60 TABLET | Refills: 0 | Status: SHIPPED | OUTPATIENT
Start: 2023-10-12

## 2023-10-12 RX ADMIN — PRENATAL VITAMINS-IRON FUMARATE 27 MG IRON-FOLIC ACID 0.8 MG TABLET 1 TABLET: at 09:40

## 2023-10-12 RX ADMIN — SIMETHICONE 80 MG: 80 TABLET, CHEWABLE ORAL at 07:16

## 2023-10-12 RX ADMIN — ACETAMINOPHEN 1000 MG: 500 TABLET, FILM COATED ORAL at 09:41

## 2023-10-12 RX ADMIN — ACETAMINOPHEN 1000 MG: 500 TABLET, FILM COATED ORAL at 01:52

## 2023-10-12 RX ADMIN — IBUPROFEN 800 MG: 800 TABLET ORAL at 05:04

## 2023-10-12 RX ADMIN — DOCUSATE SODIUM 100 MG: 100 CAPSULE, LIQUID FILLED ORAL at 09:40

## 2023-10-12 RX ADMIN — FERROUS SULFATE TAB EC 324 MG (65 MG FE EQUIVALENT) 324 MG: 324 (65 FE) TABLET DELAYED RESPONSE at 07:16

## 2023-10-12 NOTE — PLAN OF CARE
Goal Outcome Evaluation:  Plan of Care Reviewed With: patient        Progress: improving  Outcome Evaluation: VSS, adequate pain relief,bottlefeeding,+ bonding, ambulating,anticipate discharge

## 2023-10-12 NOTE — PROGRESS NOTES
BROOKE Fam Martin  : 1995  MRN: [id number]  CSN: [id number]    Post-operative Day #2  Subjective   Her pain is well controlled.  Vaginal bleeding is appropriate amount.  She is passing gas and has not had a bowel movement.  Upon review of her respiratory system, she has no respiratory symptoms and and denies SOB, cough, wheezing, chest pain. She is bottle feeding     Objective     Min/max vitals past 24 hours:   Temp  Min: 97.7 øF (36.5 øC)  Max: 98.5 øF (36.9 øC)  BP  Min: 100/65  Max: 117/83  Pulse  Min: 75  Max: 97  Resp  Min: 16  Max: 18        General: well developed; well nourished  no acute distress   Resp: breathing is unlabored   CV: Not performed.   Abdomen: incision is clean, dry, intact, and without drainage   Pelvic: Not performed   Ext: normal appearance with no cyanosis or edema and no calf tenderness     Results from last 7 days   Lab Units 10/11/23  0556 10/10/23  1039   WBC 10*3/mm3 11.78* 11.15*   HEMOGLOBIN g/dL 10.6* 11.4*   HEMATOCRIT % 32.9* 34.0   PLATELETS 10*3/mm3 221 233     Lab Results   Component Value Date    ABO O 10/10/2023    RH Positive 10/10/2023    RUBELLAABIGG 1.72 2023    HEPBSAG Negative 2023        Assessment   POD #2 S/P Primary  (LTCS) with BTL     Plan   Continue routine post-operative care  Discharge to home    TRISH Bello  10/12/2023  09:37 EDT

## 2023-10-12 NOTE — PLAN OF CARE
Goal Outcome Evaluation:  Plan of Care Reviewed With: patient, spouse           Outcome Evaluation: Patient to be discharged home with  with spouse at bedside. VSS. I & O adequate. Patient reports incisional soreness that is relieved with oral medications. Ambulation encouraged and observed. Bottle feeding is going well. Positive bonding observed with .

## 2023-10-12 NOTE — DISCHARGE SUMMARY
Discharge Summary     Fam Martin  : 1995  MRN: [id number]  CSN: [id number]    Date of Admission: 10/10/2023   Date of Discharge:  10/12/2023   Delivering Physician: Jaylen Kim MD       Admission Diagnosis: Pregnancy [Z34.90]   Discharge Diagnosis: Same as above plus  Pregnancy at 39w0d - delivered       Procedures: Primary  (LTCS) with BTL     Hospital Course  See the completed operative report for details regarding antepartum course and delivery.    Her post-operative course was unremarkable.  On POD # 2 she felt like she was ready for D/C.  She was evaluated and it was determined she was able to be discharged to home.  She had no febrile morbidity. She had normal bowel and bladder function and was hemodynamically stable.  Her wound was healing well without obvious signs of infections. She is bottle feeding.    Infant  male  fetus weighing 3040 g (6 lb 11.2 oz)   Apgars -  9 @ 1 minute /  9 @ 5 minutes.    Discharge labs  Lab Results   Component Value Date    WBC 11.78 (H) 10/11/2023    HGB 10.6 (L) 10/11/2023    HCT 32.9 (L) 10/11/2023     10/11/2023       Discharge Medications     Discharge Medications        New Medications        Instructions Start Date   acetaminophen 500 MG tablet  Commonly known as: TYLENOL   1,000 mg, Oral, Every 8 Hours PRN      ibuprofen 800 MG tablet  Commonly known as: ADVIL,MOTRIN   800 mg, Oral, Every 8 Hours PRN      oxyCODONE 5 MG immediate release tablet  Commonly known as: ROXICODONE   5 mg, Oral, Every 6 Hours PRN             Continue These Medications        Instructions Start Date   ferrous sulfate 325 (65 FE) MG tablet   325 mg, Oral, Daily With Breakfast      PRENATAL VITAMINS PO   Oral               Discharge Disposition Home or Self Care   Condition on Discharge: good   Follow-up: 1 week with [facility].     Time spent on discharge: 30 minutes or less  Trini Brown, TRISH  10/12/2023

## 2023-10-20 ENCOUNTER — OFFICE VISIT (OUTPATIENT)
Dept: OBSTETRICS AND GYNECOLOGY | Facility: CLINIC | Age: 28
End: 2023-10-20
Payer: COMMERCIAL

## 2023-10-20 VITALS
HEIGHT: 63 IN | SYSTOLIC BLOOD PRESSURE: 110 MMHG | WEIGHT: 152 LBS | DIASTOLIC BLOOD PRESSURE: 64 MMHG | BODY MASS INDEX: 26.93 KG/M2

## 2023-10-20 DIAGNOSIS — Z09 POSTOPERATIVE FOLLOW-UP: Primary | ICD-10-CM

## 2023-10-20 PROCEDURE — 99024 POSTOP FOLLOW-UP VISIT: CPT | Performed by: MIDWIFE

## 2023-10-20 NOTE — PROGRESS NOTES
"    Postpartum Progress Note    Patient name: Della Martin  Date of Service: 10/20/2023    ID: 28 y.o.     Diagnosis:   Chief Complaint   Patient presents with    Post-op Follow-up     No Complaints/concerns       1 week post op  section and tubal  Patient Active Problem List   Diagnosis    Herpes zoster without complication    Trauma in childhood    Generalized anxiety disorder    Third trimester pregnancy    Pregnancy    S/P  section       Subjective:      No complaints, Lochia light .  Ambulating, B&B habits wnl, tolerating regular diet  Pain well controlled. She is taking Tylenol and Ibuprofen.  The patient is not currently breastfeeding.       Objective:      Vital signs:  /64   Ht 160 cm (63\")   Wt 68.9 kg (152 lb)   LMP 01/10/2023 (Exact Date)   Breastfeeding No   BMI 26.93 kg/m²    General: Alert & oriented x4, in no apparent distress  Abdomen: soft, nontender  Uterus: firm, nontender  Incision: healing well  Extremities: nontender; no edema      Labs:  Lab Results   Component Value Date    WBC 11.78 (H) 10/11/2023    HGB 10.6 (L) 10/11/2023    HCT 32.9 (L) 10/11/2023    MCV 93.5 10/11/2023     10/11/2023             Assessment/Plan:        1. S/p  delivery: Doing well.  2. Infant feeding: Supportive care.  The patient is not currently breastfeeding.  3. Contraception: Tubal ligation  4. RTO 5 weeks     No orders of the defined types were placed in this encounter.      Trini Brown CNM  10/20/2023    "

## 2023-11-08 ENCOUNTER — APPOINTMENT (OUTPATIENT)
Dept: GENERAL RADIOLOGY | Facility: HOSPITAL | Age: 28
End: 2023-11-08
Payer: COMMERCIAL

## 2023-11-08 ENCOUNTER — HOSPITAL ENCOUNTER (EMERGENCY)
Facility: HOSPITAL | Age: 28
Discharge: HOME | End: 2023-11-08
Attending: STUDENT IN AN ORGANIZED HEALTH CARE EDUCATION/TRAINING PROGRAM | Admitting: STUDENT IN AN ORGANIZED HEALTH CARE EDUCATION/TRAINING PROGRAM
Payer: COMMERCIAL

## 2023-11-08 VITALS
HEART RATE: 70 BPM | TEMPERATURE: 98.3 F | DIASTOLIC BLOOD PRESSURE: 80 MMHG | BODY MASS INDEX: 26.05 KG/M2 | WEIGHT: 147 LBS | SYSTOLIC BLOOD PRESSURE: 119 MMHG | OXYGEN SATURATION: 99 % | RESPIRATION RATE: 28 BRPM | HEIGHT: 63 IN

## 2023-11-08 DIAGNOSIS — R07.9 CHEST PAIN, UNSPECIFIED TYPE: Primary | ICD-10-CM

## 2023-11-08 DIAGNOSIS — R06.00 DYSPNEA, UNSPECIFIED TYPE: ICD-10-CM

## 2023-11-08 LAB
ALBUMIN SERPL-MCNC: 4.6 G/DL (ref 3.5–5.2)
ALBUMIN/GLOB SERPL: 1.5 G/DL
ALP SERPL-CCNC: 99 U/L (ref 39–117)
ALT SERPL W P-5'-P-CCNC: 30 U/L (ref 1–33)
ANION GAP SERPL CALCULATED.3IONS-SCNC: 9.5 MMOL/L (ref 5–15)
AST SERPL-CCNC: 21 U/L (ref 1–32)
BASOPHILS # BLD AUTO: 0.05 10*3/MM3 (ref 0–0.2)
BASOPHILS NFR BLD AUTO: 0.8 % (ref 0–1.5)
BILIRUB SERPL-MCNC: 0.3 MG/DL (ref 0–1.2)
BUN SERPL-MCNC: 12 MG/DL (ref 6–20)
BUN/CREAT SERPL: 17.4 (ref 7–25)
CALCIUM SPEC-SCNC: 9.3 MG/DL (ref 8.6–10.5)
CHLORIDE SERPL-SCNC: 104 MMOL/L (ref 98–107)
CO2 SERPL-SCNC: 27.5 MMOL/L (ref 22–29)
CREAT SERPL-MCNC: 0.69 MG/DL (ref 0.57–1)
D DIMER PPP FEU-MCNC: 0.39 MCGFEU/ML (ref 0–0.5)
DEPRECATED RDW RBC AUTO: 44.4 FL (ref 37–54)
EGFRCR SERPLBLD CKD-EPI 2021: 122.9 ML/MIN/1.73
EOSINOPHIL # BLD AUTO: 0.2 10*3/MM3 (ref 0–0.4)
EOSINOPHIL NFR BLD AUTO: 3.1 % (ref 0.3–6.2)
ERYTHROCYTE [DISTWIDTH] IN BLOOD BY AUTOMATED COUNT: 13.2 % (ref 12.3–15.4)
FLUAV SUBTYP SPEC NAA+PROBE: NOT DETECTED
FLUBV RNA ISLT QL NAA+PROBE: NOT DETECTED
GEN 5 2HR TROPONIN T REFLEX: <6 NG/L
GLOBULIN UR ELPH-MCNC: 3 GM/DL
GLUCOSE SERPL-MCNC: 92 MG/DL (ref 65–99)
HCT VFR BLD AUTO: 40.2 % (ref 34–46.6)
HGB BLD-MCNC: 13.3 G/DL (ref 12–15.9)
HOLD SPECIMEN: NORMAL
HOLD SPECIMEN: NORMAL
IMM GRANULOCYTES # BLD AUTO: 0.03 10*3/MM3 (ref 0–0.05)
IMM GRANULOCYTES NFR BLD AUTO: 0.5 % (ref 0–0.5)
LYMPHOCYTES # BLD AUTO: 2.63 10*3/MM3 (ref 0.7–3.1)
LYMPHOCYTES NFR BLD AUTO: 40.2 % (ref 19.6–45.3)
MCH RBC QN AUTO: 30 PG (ref 26.6–33)
MCHC RBC AUTO-ENTMCNC: 33.1 G/DL (ref 31.5–35.7)
MCV RBC AUTO: 90.7 FL (ref 79–97)
MONOCYTES # BLD AUTO: 0.34 10*3/MM3 (ref 0.1–0.9)
MONOCYTES NFR BLD AUTO: 5.2 % (ref 5–12)
NEUTROPHILS NFR BLD AUTO: 3.29 10*3/MM3 (ref 1.7–7)
NEUTROPHILS NFR BLD AUTO: 50.2 % (ref 42.7–76)
NRBC BLD AUTO-RTO: 0 /100 WBC (ref 0–0.2)
PLATELET # BLD AUTO: 282 10*3/MM3 (ref 140–450)
PMV BLD AUTO: 10.1 FL (ref 6–12)
POTASSIUM SERPL-SCNC: 4 MMOL/L (ref 3.5–5.2)
PROT SERPL-MCNC: 7.6 G/DL (ref 6–8.5)
RBC # BLD AUTO: 4.43 10*6/MM3 (ref 3.77–5.28)
SARS-COV-2 RNA RESP QL NAA+PROBE: NOT DETECTED
SODIUM SERPL-SCNC: 141 MMOL/L (ref 136–145)
TROPONIN T DELTA: NORMAL
TROPONIN T SERPL HS-MCNC: <6 NG/L
WBC NRBC COR # BLD: 6.54 10*3/MM3 (ref 3.4–10.8)
WHOLE BLOOD HOLD COAG: NORMAL
WHOLE BLOOD HOLD SPECIMEN: NORMAL

## 2023-11-08 PROCEDURE — 84484 ASSAY OF TROPONIN QUANT: CPT | Performed by: STUDENT IN AN ORGANIZED HEALTH CARE EDUCATION/TRAINING PROGRAM

## 2023-11-08 PROCEDURE — 36415 COLL VENOUS BLD VENIPUNCTURE: CPT

## 2023-11-08 PROCEDURE — 99284 EMERGENCY DEPT VISIT MOD MDM: CPT

## 2023-11-08 PROCEDURE — 93005 ELECTROCARDIOGRAM TRACING: CPT

## 2023-11-08 PROCEDURE — 84484 ASSAY OF TROPONIN QUANT: CPT

## 2023-11-08 PROCEDURE — 80053 COMPREHEN METABOLIC PANEL: CPT

## 2023-11-08 PROCEDURE — 85025 COMPLETE CBC W/AUTO DIFF WBC: CPT

## 2023-11-08 PROCEDURE — 71045 X-RAY EXAM CHEST 1 VIEW: CPT

## 2023-11-08 PROCEDURE — 85379 FIBRIN DEGRADATION QUANT: CPT | Performed by: PHYSICIAN ASSISTANT

## 2023-11-08 PROCEDURE — 93005 ELECTROCARDIOGRAM TRACING: CPT | Performed by: STUDENT IN AN ORGANIZED HEALTH CARE EDUCATION/TRAINING PROGRAM

## 2023-11-08 PROCEDURE — 87636 SARSCOV2 & INF A&B AMP PRB: CPT | Performed by: PHYSICIAN ASSISTANT

## 2023-11-08 RX ORDER — SODIUM CHLORIDE 0.9 % (FLUSH) 0.9 %
10 SYRINGE (ML) INJECTION AS NEEDED
Status: DISCONTINUED | OUTPATIENT
Start: 2023-11-08 | End: 2023-11-08 | Stop reason: HOSPADM

## 2023-11-08 RX ORDER — ASPIRIN 81 MG/1
324 TABLET, CHEWABLE ORAL ONCE
Status: COMPLETED | OUTPATIENT
Start: 2023-11-08 | End: 2023-11-08

## 2023-11-08 RX ADMIN — ASPIRIN 81 MG CHEWABLE TABLET 324 MG: 81 TABLET CHEWABLE at 15:36

## 2023-11-08 NOTE — ED PROVIDER NOTES
Subjective:  Chief Complaint:  Chest pain, SOA    History of Present Illness:  Patient is a 28-year-old female presenting to the ER with complaints of chest pain and shortness of breath which she states started around a week ago and has been pretty persistent.  She denies any known cardiac history.  She did have a  a few weeks ago.  Denies any history of blood clots, current hormone use, or recent travel.  She denies cough, congestion, fever, and additional symptoms or complaints at this time.      Nurses Notes reviewed and agree, including vitals, allergies, social history and prior medical history.     REVIEW OF SYSTEMS: All systems reviewed and not pertinent unless noted.  Review of Systems   Respiratory:  Positive for shortness of breath.    Cardiovascular:  Positive for chest pain.   All other systems reviewed and are negative.      Past Medical History:   Diagnosis Date    Seizure     As a child around age 13 after a fall, none since    Varicella        Allergies:    Cephalosporins and Albuterol      Past Surgical History:   Procedure Laterality Date     SECTION N/A 10/10/2023    Procedure:  SECTION PRIMARY WITH BILATERAL TUBAL LIGATION;  Surgeon: Jaylen Kim MD;  Location: [facility];  Service: Obstetrics/Gynecology;  Laterality: N/A;         Social History     Socioeconomic History    Marital status:    Tobacco Use    Smoking status: Never    Smokeless tobacco: Never   Vaping Use    Vaping Use: Never used   Substance and Sexual Activity    Alcohol use: No    Drug use: No    Sexual activity: Yes     Partners: Male     Birth control/protection: Coitus interruptus         Family History   Problem Relation Age of Onset    Diabetes Maternal Aunt         Great aunt    Diabetes Maternal Uncle         Great Uncle    Anxiety disorder Mother     Depression Mother     Anxiety disorder Maternal Grandmother     COPD Maternal Grandmother     Hyperlipidemia Maternal Grandfather   "   Arthritis Paternal Grandmother         Great maternal grandmother       Objective  Physical Exam:  /80 (BP Location: Left arm, Patient Position: Sitting)   Pulse 96   Temp 98.3 °F (36.8 °C) (Oral)   Resp 28   Ht 160 cm (63\")   Wt 66.7 kg (147 lb)   SpO2 100%   BMI 26.04 kg/m²      Physical Exam  Vitals and nursing note reviewed.   Constitutional:       General: She is not in acute distress.     Appearance: She is not toxic-appearing.   HENT:      Head: Normocephalic and atraumatic.   Eyes:      Extraocular Movements: Extraocular movements intact.   Cardiovascular:      Rate and Rhythm: Normal rate.      Heart sounds: Normal heart sounds.   Pulmonary:      Effort: Pulmonary effort is normal.   Abdominal:      Palpations: Abdomen is soft.      Tenderness: There is no abdominal tenderness.   Musculoskeletal:         General: Normal range of motion.      Cervical back: Normal range of motion and neck supple.   Skin:     General: Skin is warm and dry.   Neurological:      General: No focal deficit present.      Mental Status: She is alert and oriented to person, place, and time.   Psychiatric:         Mood and Affect: Mood normal.         Behavior: Behavior normal.         Procedures    ED Course:    ED Course as of 11/08/23 1627   Wed Nov 08, 2023   1334 EKG interpreted by me, normal sinus rhythm no concerning ST changes noted, rate of 77 [JE]      ED Course User Index  [JE] Haroldo Edwards MD       Lab Results (last 24 hours)       Procedure Component Value Units Date/Time    CBC & Differential [[id number]]  (Normal) Collected: 11/08/23 1331    Specimen: Blood Updated: 11/08/23 1341    Narrative:      The following orders were created for panel order CBC & Differential.  Procedure                               Abnormality         Status                     ---------                               -----------         ------                     CBC Auto Differential[[id number]]        Normal             "  Final result                 Please view results for these tests on the individual orders.    Comprehensive Metabolic Panel [[id number]] Collected: 11/08/23 1331    Specimen: Blood Updated: 11/08/23 1406     Glucose 92 mg/dL      BUN 12 mg/dL      Creatinine 0.69 mg/dL      Sodium 141 mmol/L      Potassium 4.0 mmol/L      Chloride 104 mmol/L      CO2 27.5 mmol/L      Calcium 9.3 mg/dL      Total Protein 7.6 g/dL      Albumin 4.6 g/dL      ALT (SGPT) 30 U/L      AST (SGOT) 21 U/L      Alkaline Phosphatase 99 U/L      Total Bilirubin 0.3 mg/dL      Globulin 3.0 gm/dL      A/G Ratio 1.5 g/dL      BUN/Creatinine Ratio 17.4     Anion Gap 9.5 mmol/L      eGFR 122.9 mL/min/1.73     Narrative:      GFR Normal >60  Chronic Kidney Disease <60  Kidney Failure <15      High Sensitivity Troponin T [[id number]]  (Normal) Collected: 11/08/23 1331    Specimen: Blood Updated: 11/08/23 1409     HS Troponin T <6 ng/L     Narrative:      High Sensitive Troponin T Reference Range:  <14.0 ng/L- Negative Female for AMI  <22.0 ng/L- Negative Male for AMI  >=14 - Abnormal Female indicating possible myocardial injury.  >=22 - Abnormal Male indicating possible myocardial injury.   Clinicians would have to utilize clinical acumen, EKG, Troponin, and serial changes to determine if it is an Acute Myocardial Infarction or myocardial injury due to an underlying chronic condition.         CBC Auto Differential [[id number]]  (Normal) Collected: 11/08/23 1331    Specimen: Blood Updated: 11/08/23 1341     WBC 6.54 10*3/mm3      RBC 4.43 10*6/mm3      Hemoglobin 13.3 g/dL      Hematocrit 40.2 %      MCV 90.7 fL      MCH 30.0 pg      MCHC 33.1 g/dL      RDW 13.2 %      RDW-SD 44.4 fl      MPV 10.1 fL      Platelets 282 10*3/mm3      Neutrophil % 50.2 %      Lymphocyte % 40.2 %      Monocyte % 5.2 %      Eosinophil % 3.1 %      Basophil % 0.8 %      Immature Grans % 0.5 %      Neutrophils, Absolute 3.29 10*3/mm3      Lymphocytes, Absolute 2.63 10*3/mm3      " Monocytes, Absolute 0.34 10*3/mm3      Eosinophils, Absolute 0.20 10*3/mm3      Basophils, Absolute 0.05 10*3/mm3      Immature Grans, Absolute 0.03 10*3/mm3      nRBC 0.0 /100 WBC     D-dimer, Quantitative [[id number]]  (Normal) Collected: 11/08/23 1331    Specimen: Blood Updated: 11/08/23 1534     D-Dimer, Quantitative 0.39 MCGFEU/mL     Narrative:      According to the assay 's published package insert, a normal (<0.50 MCGFEU/mL) D-dimer result in conjunction with a non-high clinical probability assessment, excludes deep vein thrombosis (DVT) and pulmonary embolism (PE) with high sensitivity.    D-dimer values increase with age and this can make VTE exclusion of an older population difficult. To address this, the [employer], based on best available evidence and recent guidelines, recommends that clinicians use age-adjusted D-dimer thresholds in patients greater than 50 years of age with: a) a low probability of PE who do not meet all Pulmonary Embolism Rule Out Criteria, or b) in those with intermediate probability of PE.   The formula for an age-adjusted D-dimer cut-off is \"age/100\".  For example, a 60 year old patient would have an age-adjusted cut-off of 0.60 MCGFEU/mL and an 80 year old 0.80 MCGFEU/mL.    COVID-19 and FLU A/B PCR - Swab, Nasopharynx [[id number]]  (Normal) Collected: 11/08/23 1536    Specimen: Swab from Nasopharynx Updated: 11/08/23 1606     COVID19 Not Detected     Influenza A PCR Not Detected     Influenza B PCR Not Detected    Narrative:      Fact sheet for providers: https://www.fda.gov/media/840413/download    Fact sheet for patients: https://www.fda.gov/media/302789/download    Test performed by PCR.    High Sensitivity Troponin T 2Hr [[id number]] Collected: 11/08/23 1546    Specimen: Blood Updated: 11/08/23 1611     HS Troponin T <6 ng/L      Troponin T Delta --     Comment: Unable to calculate.       Narrative:      High Sensitive Troponin T Reference " Range:  <14.0 ng/L- Negative Female for AMI  <22.0 ng/L- Negative Male for AMI  >=14 - Abnormal Female indicating possible myocardial injury.  >=22 - Abnormal Male indicating possible myocardial injury.   Clinicians would have to utilize clinical acumen, EKG, Troponin, and serial changes to determine if it is an Acute Myocardial Infarction or myocardial injury due to an underlying chronic condition.                  XR Chest 1 View    Result Date: 2023  PROCEDURE: XR CHEST 1 VW-    HISTORY: Chest Pain Triage Protocol, central anterior chest pain, shortness of breath  COMPARISON: None.  FINDINGS: The heart is normal in size. The mediastinum is unremarkable. The lungs are clear. There is no pneumothorax. There are no acute osseous abnormalities.      Impression: No acute cardiopulmonary process.        Images were reviewed, interpreted, and dictated by Dr. Deepa Tristan MD Transcribed by Zelda Ash PA-C.  This report was signed and finalized on 2023 2:18 PM by Deepa Tristan MD.        HEART Score: 0    MDM  Patient was evaluated in the ER for chest pain and shortness of breath x1 week.  She is hemodynamically stable, afebrile, nontoxic-appearing on exam.  She did recently have a .  Differential diagnosis includes but is not limited to ACS, cardiac arrhythmia, pleuritis, viral illness, anxiety, PE, among others.  Initial plan includes CBC, CMP, D-dimer, troponins, COVID/flu swab, EKG, chest x-ray, and treatment with aspirin.    Labs unremarkable.  Normal D-dimer and troponins.  Patient has remained stable throughout ER visit.  She is relieved to know that work-up was reassuring.  She was advised to take Tylenol and Motrin as needed per directions on the package.  Advised follow-up with PCP for further outpatient evaluation if symptoms persist.  Information was also provided for Dr. Jara, cardiology, for further outpatient evaluation if symptoms persist.  Precautions were given for return to  the ER for any new or worsening symptoms.    Final diagnoses:   Chest pain, unspecified type   Dyspnea, unspecified type          Connie Rios PA-C  11/08/23 [id number]

## 2023-11-08 NOTE — DISCHARGE INSTRUCTIONS
Take Tylenol and ibuprofen per directions on the package as needed for pain.  Follow-up with your PCP and cardiologist, Dr. Jara, for further outpatient evaluation and definitive care of chest pain and shortness of breath if symptoms persist.  Return to the ER for new or worsening symptoms or acute concerns.

## 2023-11-21 ENCOUNTER — POSTPARTUM VISIT (OUTPATIENT)
Dept: OBSTETRICS AND GYNECOLOGY | Facility: CLINIC | Age: 28
End: 2023-11-21
Payer: COMMERCIAL

## 2023-11-21 VITALS
BODY MASS INDEX: 27.21 KG/M2 | DIASTOLIC BLOOD PRESSURE: 64 MMHG | WEIGHT: 153.6 LBS | HEIGHT: 63 IN | SYSTOLIC BLOOD PRESSURE: 110 MMHG

## 2023-11-21 DIAGNOSIS — Z12.4 SCREENING FOR CERVICAL CANCER: Primary | ICD-10-CM

## 2023-11-21 NOTE — PROGRESS NOTES
Subjective   Chief Complaint   Patient presents with    Postpartum Care     6 Weeks postpartum     Della Martin is a 28 y.o. year old .  No LMP recorded.  She presents to be seen because of 6 weeks postpartum/annual exam needing Pap today..     OTHER COMPLAINTS:  Nothing else    The following portions of the patient's history were reviewed and updated as appropriate:She  has a past medical history of Seizure and Varicella.  She does not have any pertinent problems on file.  She  has a past surgical history that includes  section (N/A, 10/10/2023) and Tubal ligation.  Her family history includes Anxiety disorder in her maternal grandmother and mother; Arthritis in her paternal grandmother; COPD in her maternal grandmother; Depression in her mother; Diabetes in her maternal aunt and maternal uncle; Hyperlipidemia in her maternal grandfather.  She  reports that she has never smoked. She has never used smokeless tobacco. She reports that she does not drink alcohol and does not use drugs.  Current Outpatient Medications   Medication Sig Dispense Refill    acetaminophen (TYLENOL) 500 MG tablet Take 2 tablets by mouth Every 8 (Eight) Hours As Needed for Mild Pain. 60 tablet 0    ferrous sulfate 325 (65 FE) MG tablet Take 1 tablet by mouth Daily With Breakfast. 60 tablet 10    ibuprofen (ADVIL,MOTRIN) 800 MG tablet Take 1 tablet by mouth Every 8 (Eight) Hours As Needed for Mild Pain. 60 tablet 0    Prenatal Vit-Fe Fumarate-FA (PRENATAL VITAMINS PO) Take  by mouth.       No current facility-administered medications for this visit.     Current Outpatient Medications on File Prior to Visit   Medication Sig    acetaminophen (TYLENOL) 500 MG tablet Take 2 tablets by mouth Every 8 (Eight) Hours As Needed for Mild Pain.    ferrous sulfate 325 (65 FE) MG tablet Take 1 tablet by mouth Daily With Breakfast.    ibuprofen (ADVIL,MOTRIN) 800 MG tablet Take 1 tablet by mouth Every 8 (Eight) Hours As Needed for Mild Pain.  "   Prenatal Vit-Fe Fumarate-FA (PRENATAL VITAMINS PO) Take  by mouth.     No current facility-administered medications on file prior to visit.     She is allergic to cephalosporins and albuterol.    Social History    Tobacco Use      Smoking status: Never      Smokeless tobacco: Never    Review of Systems  Consitutional POS: nothing reported    NEG: anorexia or night sweats   Gastointestinal POS: nothing reported    NEG: bloating, change in bowel habits, melena, or reflux symptoms   Genitourinary POS: nothing reported    NEG: dysuria or hematuria   Integument POS: nothing reported    NEG: moles that are changing in size, shape, color or rashes   Breast POS: nothing reported    NEG: persistent breast lump, skin dimpling, or nipple discharge         Respiratory: negative  Cardiovascular: negative  GYN:  negative          Objective   /64   Ht 160 cm (63\")   Wt 69.7 kg (153 lb 9.6 oz)   Breastfeeding No   BMI 27.21 kg/m²     General:  well developed; well nourished  no acute distress   Skin:  No suspicious lesions seen   Thyroid: normal to inspection and palpation   Lungs:  breathing is unlabored  clear to auscultation bilaterally   Heart:  regular rate and rhythm, S1, S2 normal, no murmur, click, rub or gallop   Breasts:  Examined in supine position  Symmetric without masses or skin dimpling  Nipples normal without inversion, lesions or discharge  There are no palpable axillary nodes   Abdomen: soft, non-tender; no masses  no umbilical or inguinal hernias are present  no hepato-splenomegaly   Pelvis: Clinical staff was present for exam  External genitalia:  normal appearance of the external genitalia including Bartholin's and Salida's glands.  :  urethral meatus normal;  Vaginal:  normal pink mucosa without prolapse or lesions.  Cervix:  normal appearance.  Uterus:  normal size, shape and consistency.  Adnexa:  normal bimanual exam of the adnexa.  Rectal:  digital rectal exam not performed; anus visually " normal appearing.     Psychiatric: Alert and oriented ×3, mood and affect appropriate  HEENT: Atraumatic, normocephalic, normal scleral icterus  Extremities: 2+ pulses bilaterally, no edema      Lab Review   No data reviewed    Imaging   No data reviewed        Assessment   Normal PE     Plan   Post op doing well, incision healed  PAP done    No orders of the defined types were placed in this encounter.         This note was electronically signed.      November 21, 2023

## 2023-11-28 LAB — REF LAB TEST METHOD: NORMAL

## 2024-03-18 ENCOUNTER — OFFICE VISIT (OUTPATIENT)
Dept: BEHAVIORAL HEALTH | Facility: CLINIC | Age: 29
End: 2024-03-18
Payer: COMMERCIAL

## 2024-03-18 VITALS
HEIGHT: 63 IN | WEIGHT: 147 LBS | BODY MASS INDEX: 26.05 KG/M2 | DIASTOLIC BLOOD PRESSURE: 72 MMHG | SYSTOLIC BLOOD PRESSURE: 116 MMHG

## 2024-03-18 DIAGNOSIS — F90.2 ATTENTION DEFICIT HYPERACTIVITY DISORDER, COMBINED TYPE: Primary | ICD-10-CM

## 2024-03-18 DIAGNOSIS — Z51.81 ENCOUNTER FOR THERAPEUTIC DRUG MONITORING: ICD-10-CM

## 2024-03-18 DIAGNOSIS — F33.0 MILD EPISODE OF RECURRENT MAJOR DEPRESSIVE DISORDER: ICD-10-CM

## 2024-03-18 DIAGNOSIS — F41.1 GENERALIZED ANXIETY DISORDER: ICD-10-CM

## 2024-03-18 PROCEDURE — 90792 PSYCH DIAG EVAL W/MED SRVCS: CPT

## 2024-03-18 RX ORDER — SERTRALINE HYDROCHLORIDE 25 MG/1
25 TABLET, FILM COATED ORAL DAILY
Qty: 30 TABLET | Refills: 1 | Status: SHIPPED | OUTPATIENT
Start: 2024-03-18

## 2024-03-18 RX ORDER — DEXTROAMPHETAMINE SACCHARATE, AMPHETAMINE ASPARTATE MONOHYDRATE, DEXTROAMPHETAMINE SULFATE AND AMPHETAMINE SULFATE 2.5; 2.5; 2.5; 2.5 MG/1; MG/1; MG/1; MG/1
10 CAPSULE, EXTENDED RELEASE ORAL DAILY
Qty: 30 CAPSULE | Refills: 0 | Status: SHIPPED | OUTPATIENT
Start: 2024-03-18

## 2024-03-18 RX ORDER — BUSPIRONE HYDROCHLORIDE 10 MG/1
10 TABLET ORAL NIGHTLY
Qty: 30 TABLET | Refills: 1 | Status: SHIPPED | OUTPATIENT
Start: 2024-03-18

## 2024-03-18 RX ORDER — SERTRALINE HYDROCHLORIDE 25 MG/1
25 TABLET, FILM COATED ORAL DAILY
COMMUNITY
Start: 2024-02-12 | End: 2024-03-18 | Stop reason: SDUPTHER

## 2024-03-18 NOTE — PROGRESS NOTES
New Patient Office Visit    Patient Name: Della Martin  : 1995   MRN: [id number]   Care Team: Patient Care Team:  Julisa Elena APRN as PCP - General (Family Medicine)  Eri Gonzáles APRN as Nurse Practitioner (Behavioral Health)         Chief Complaint:    Chief Complaint   Patient presents with    ADHD    Anxiety    Depression    Med Management       History of Present Illness: Della Martin is a 29 y.o. female who is here today to establish care. Patient endorses a history of anxiety and depression that she has dealt with for many years. She was tried on a couple of different medications throughout her life, however, they either did not work or she never started them dur to fear. Recently, she was started on Zoloft by her PCP and she does feel that it has helped with her depression but she still struggles with her anxiety. She also feels that she has been struggling more and more with her focus and concentration. When discussing this with her PCP she was referred  for and ADHD evaluation. Patient states remembering back to elementary school when her struggles with focus, concentration and task completion began. She states school was hard for her growing up and she seemed to struggle more than other kids. She did pass, but it was not without a lot of effort and time on her part. Now, she states she significant struggles to stay focused and on task most of the day. She will find herself jumping from task to task before completing anything, she will procrastinate often and if something overwhelms her, she will push it off until the last possible moment. She states she cannot sit still without thinking there is something she needs to be doing. She feels that a lot of her ADHD symptoms fuel her anxiety and can make it worse at times. She denies any SI/HI today.     The following portion of the patient's history were reviewed and updated appropriately: allergies, current and past medications,  family history, medical history and social history. DURAN reviewed and appropriate.   Subjective   Review of Systems:    Review of Systems   Respiratory: Negative.     Cardiovascular: Negative.  Negative for chest pain and palpitations.   Neurological: Negative.    Psychiatric/Behavioral:  Positive for decreased concentration, depressed mood and stress. The patient is nervous/anxious.    All other systems reviewed and are negative.      PSYCHIATRIC HISTORY   Current Psychiatric Medications:  Zoloft   Prior Psychiatric Medications:  Effexor  Currently in Counseling or Therapy:  None   Prior Psychiatric Outpatient Care:  PCP  Prior Psychiatric Hospitalizations:  None   Previous Suicide Attempts:  None   Previous Self-Harming Behavior:  None   History of Seizures or TBI:  Yes, once when 13 after a fall   Abuse History:  Verbal: no  Emotional: no  Mental: no  Physical: no  Sexual: no  Rape: no        Family Psychiatric History:  Depression, Bipolar, Anxiety  Any family history of suicide attempts:  None       Substance Abuse History:  Alcohol: no  Smoking/Cigarettes: no  Vaping: no  Smokeless Tobacco: no  Illicit Substances: no  Prescription Medication Misuse: no    Social History:  Born: [place], [place]   Raised: [place]   Currently resides in [place], [place]   Marriage status:    Children: 2  Lives with: The patient's currently household consists of the patient,  and two children   Highest Level of Education: some college   Employment: [employer]   Legal History, Arrests, or Incarcerations: No current legal charges pending.  Patient's Support Network Includes:    Last Menstrual Period: 3/11/2024         Current Medications:   Current Outpatient Medications   Medication Sig Dispense Refill    sertraline (ZOLOFT) 25 MG tablet Take 1 tablet by mouth Daily. 30 tablet 1    amphetamine-dextroamphetamine XR (Adderall XR) 10 MG 24 hr capsule Take 1 capsule by mouth Daily 30 capsule 0    busPIRone (BUSPAR) 10 MG  "tablet Take 1 tablet by mouth Every Night. 30 tablet 1     No current facility-administered medications for this visit.       Mental Status Exam:   Hygiene:   good  Cooperation:  Cooperative  Eye Contact:  Good  Psychomotor Behavior:  Appropriate  Affect:  Appropriate  Mood: anxious  Speech:  Normal  Thought Process:  Goal directed and Linear  Thought Content:  Normal and Mood congruent  Suicidal:  None  Homicidal:  None  Hallucinations:  None  Delusion:  None  Memory:  Intact  Orientation:  Person, Place, Time, and Situation  Reliability:  good  Insight:  Good  Judgement:  Good  Impulse Control:  Good  Physical/Medical Issues:  No      Objective   Vital Signs:   /72   Ht 160 cm (63\")   Wt 66.7 kg (147 lb)   BMI 26.04 kg/m²       Assessment / Plan    Diagnoses and all orders for this visit:    1. Attention deficit hyperactivity disorder, combined type (Primary)  -     amphetamine-dextroamphetamine XR (Adderall XR) 10 MG 24 hr capsule; Take 1 capsule by mouth Daily  Dispense: 30 capsule; Refill: 0  -     Compliance Drug Analysis, Ur - Urine, Clean Catch    2. Generalized anxiety disorder  -     busPIRone (BUSPAR) 10 MG tablet; Take 1 tablet by mouth Every Night.  Dispense: 30 tablet; Refill: 1    3. Mild episode of recurrent major depressive disorder  -     sertraline (ZOLOFT) 25 MG tablet; Take 1 tablet by mouth Daily.  Dispense: 30 tablet; Refill: 1    4. Encounter for therapeutic drug monitoring  -     Compliance Drug Analysis, Ur - Urine, Clean Catch     Will start Adderall XR daily and Buspar nightly.  Continue Zoloft as ordered.     Obtained yearly urine drug screen and controlled substance agreement signed.     History and physical exam exhibit safe and appropriate use of controlled substance.    As part of patient's treatment plan I am prescribing a controlled substance.  The patient has been made aware of the appropriate use of such medications.  It has also been made clear that these medications " are for use by this patient only, without concomitant use of alcohol or other substances, unless prescribed.    Patient has completed a prescribing agreement detailing terms of continued prescribing of controlled substances, including monitoring DURAN reports, urine drug screening, and pill counts if necessary.  Patient is aware that inappropriate use will result in cessation of prescribing such medications        PHQ-2/PHQ-9: Depression Screening  Little Interest or Pleasure in Doing Things: 0-->not at all  Feeling Down, Depressed or Hopeless: 0-->not at all  PHQ-2 Total Score: 0  Trouble Falling or Staying Asleep, or Sleeping Too Much: 2-->more than half the days  Feeling Tired or Having Little Energy: 3-->nearly every day  Poor Appetite or Overeatin-->more than half the days  Feeling Bad about Yourself - or that You are a Failure or Have Let Yourself or Your Family Down: 0-->not at all  Trouble Concentrating on Things, Such as Reading the Newspaper or Watching Television: 2-->more than half the days  Moving or Speaking So Slowly that Other People Could Have Noticed? Or the Opposite - Being So Fidgety: 0-->not at all  Thoughts that You Would be Better Off Dead or of Hurting Yourself in Some Way: 0-->not at all  PHQ-9: Brief Depression Severity Measure Score: 9  If You Checked Off Any Problems, How Difficult Have These Problems Made It For You to Do Your Work, Take Care of Things at Home, or Get Along with Other People?: somewhat difficult      PHQ-9 Score:   PHQ-9 Total Score: 9    Depression Screening:  Patient screened positive for depression based on a PHQ-9 score of 9 on 3/18/2024. Follow-up recommendations include: Prescribed antidepressant medication treatment and Suicide Risk Assessment performed.      YURI-7 Score:  Feeling nervous, anxious or on edge: Several days  Not being able to stop or control worrying: Several days  Worrying too much about different things: Several days  Trouble Relaxing: More  than half the days  Being so restless that it is hard to sit still: More than half the days  Feeling afraid as if something awful might happen: Nearly every day  Becoming easily annoyed or irritable: More than half the days  YURI 7 Total Score: 12  If you checked any problems, how difficult have these problems made it for you to do your work, take care of things at home, or get along with other people: Somewhat difficult     ADHD  Screening for Adults With ADHD - (1-6)  1. How often do you have trouble wrapping up the final details of a project, once the challenging parts have been done?: Very Often  2. How often do you have difficulty getting things in order when you have to do a task that requires organization?: Often  3. How often do you have problems remembering appointments or obligations : Very Often  4. When you have a task that requires a lot of thought, how often do you avoid or delay getting started ?: Very Often  5. How often do you fidget or squirm with your hands or feet when you have to sit down for a long time?: Sometimes  6. How often do you feel overly active and compelled to do things, like you were driven by a motor?: Very Often  7. How often do you make careless mistakes when you have to work on a boring or difficult project?: Sometimes  8. How often do have difficulty keeping your attention when you are doing boring or repetitive work?: Very Often  9. How often do you have difficulty concentrating on what people say to you, even when they are speaking to you: Very Often  10.How often do you misplace or have difficulty finding things at home or at work?: Very Often  11.How often are you distracted by activity or noise around you?: Very Often  12.How often do you leave your seat in meetings or other situations in which you are expected to remain seated?: Rarely  13.How often do you feel restless or fidgety?: Often  14.How often do you have difficulty unwinding and relaxing when you have time to  yourself?: Very Often  15.How often do you find yourself talking too much when you are in social situations?: Often  16.When you’re in a conversation, how often do you find yourself finishing the sentences of the people you are talking to, before they can finish them themselves?: Often  17.How often do you have difficulty waiting your turn in situations when turn taking is required?: Often  18.How often do you interrupt others when they are busy?: Sometimes    A psychological evaluation was conducted in order to assess past and current level of functioning. Areas assessed included, but were not limited to: perception of social support, perception of ability to face and deal with challenges in life (positive functioning), anxiety symptoms, depressive symptoms, perspective on beliefs/belief system, coping skills for stress, intelligence level,  Therapeutic rapport was established. Interventions conducted today were geared towards incorporating medication management along with support for continued therapy. Education was also provided as to the med management with this provider and what to expect in subsequent sessions.      We discussed risks, benefits, goals and side effects of the above medication and the patient was agreeable with the plan. Patient was educated on the importance of compliance with treatment and follow-up appointments. Patient is aware to contact the [facility] with any worsening of symptoms. To call for questions or concerns and return early if necessary. Patent is agreeable to go to the Emergency Department or call 911 should they begin SI/HI.    MEDS ORDERED DURING VISIT:  New Medications Ordered This Visit   Medications    sertraline (ZOLOFT) 25 MG tablet     Sig: Take 1 tablet by mouth Daily.     Dispense:  30 tablet     Refill:  1    amphetamine-dextroamphetamine XR (Adderall XR) 10 MG 24 hr capsule     Sig: Take 1 capsule by mouth Daily     Dispense:  30 capsule     Refill:  0     busPIRone (BUSPAR) 10 MG tablet     Sig: Take 1 tablet by mouth Every Night.     Dispense:  30 tablet     Refill:  1         Follow Up   Return in about 4 weeks (around 4/15/2024).  Patient was given instructions and counseling regarding her condition or for health maintenance advice. Please see specific information pulled into the AVS if appropriate.     TREATMENT PLAN/GOALS: Continue supportive psychotherapy efforts and medications as indicated. Treatment and medication options discussed during today's visit. Patient acknowledged and verbally consented to continue with current treatment plan and was educated on the importance of compliance with treatment and follow-up appointments.    MEDICATION ISSUES:  Discussed medication options and treatment plan of prescribed medication as well as the risks, benefits, and side effects including potential falls, possible impaired driving and metabolic adversities among others. Patient is agreeable to call the office with any worsening of symptoms or onset of side effects. Patient is agreeable to call 911 or go to the nearest ER should he/she begin having SI/HI.        [facility]  [address] [place] [address] [address] [place]LA LU [id number] [phone]     March 19, 2024 10:17 EDT

## 2024-03-26 LAB — DRUGS UR: NORMAL

## 2024-03-28 ENCOUNTER — TELEPHONE (OUTPATIENT)
Dept: BEHAVIORAL HEALTH | Facility: CLINIC | Age: 29
End: 2024-03-28
Payer: COMMERCIAL

## 2024-03-28 DIAGNOSIS — F90.2 ATTENTION DEFICIT HYPERACTIVITY DISORDER, COMBINED TYPE: Primary | ICD-10-CM

## 2024-03-28 NOTE — TELEPHONE ENCOUNTER
PATIENT CALLED SAID SHE IS TAKING HER ADDERALL AT 9:00 A.M. AND BY 3 SHE IS WIPED OUT READY FOR BED BY 8. WANTS TO KNOW IF THIS IS NORMAL. AND IF SHE COULD GET A CALL BACK PLEASE.

## 2024-03-30 NOTE — TELEPHONE ENCOUNTER
We can either add a booster or increase the XR to hopefully get it to last longer. I would recommend increasing before adding an additional medication.     Let me know if she wants to try that.     Thanks

## 2024-04-08 RX ORDER — DEXTROAMPHETAMINE SACCHARATE, AMPHETAMINE ASPARTATE MONOHYDRATE, DEXTROAMPHETAMINE SULFATE AND AMPHETAMINE SULFATE 3.75; 3.75; 3.75; 3.75 MG/1; MG/1; MG/1; MG/1
15 CAPSULE, EXTENDED RELEASE ORAL DAILY
Qty: 30 CAPSULE | Refills: 0 | Status: SHIPPED | OUTPATIENT
Start: 2024-04-08 | End: 2024-04-10 | Stop reason: SDUPTHER

## 2024-04-10 DIAGNOSIS — F90.2 ATTENTION DEFICIT HYPERACTIVITY DISORDER, COMBINED TYPE: ICD-10-CM

## 2024-04-10 RX ORDER — DEXTROAMPHETAMINE SACCHARATE, AMPHETAMINE ASPARTATE MONOHYDRATE, DEXTROAMPHETAMINE SULFATE AND AMPHETAMINE SULFATE 3.75; 3.75; 3.75; 3.75 MG/1; MG/1; MG/1; MG/1
15 CAPSULE, EXTENDED RELEASE ORAL DAILY
Qty: 30 CAPSULE | Refills: 0 | Status: SHIPPED | OUTPATIENT
Start: 2024-04-10

## 2024-04-10 NOTE — TELEPHONE ENCOUNTER
"\"CAN WE SEND THE REFILL FOR THE ADDERALL TO [facility] IN [place] I CHANGED IT TO CORRECT ONE SHE IS LIVING THERE NOW. THANK YOU\"      Rx Refill Note  Requested Prescriptions     Pending Prescriptions Disp Refills    amphetamine-dextroamphetamine XR (Adderall XR) 15 MG 24 hr capsule 30 capsule 0     Sig: Take 1 capsule by mouth Daily      Last office visit with prescribing clinician: 3/18/2024   Last telemedicine visit with prescribing clinician: Visit date not found   Next office visit with prescribing clinician: 4/17/2024                         Would you like a call back once the refill request has been completed: [] Yes [] No    If the office needs to give you a call back, can they leave a voicemail: [] Yes [] No    Ariadne Aguilar MA  04/10/24, 14:00 EDT  "

## 2024-04-10 NOTE — TELEPHONE ENCOUNTER
CAN WE SEND THE REFILL FOR THE ADDERALL TO [facility] IN [place] I CHANGED IT TO CORRECT ONE SHE IS LIVING THERE NOW. THANK YOU

## 2024-04-17 ENCOUNTER — TELEMEDICINE (OUTPATIENT)
Dept: BEHAVIORAL HEALTH | Facility: CLINIC | Age: 29
End: 2024-04-17
Payer: COMMERCIAL

## 2024-04-17 DIAGNOSIS — F90.2 ATTENTION DEFICIT HYPERACTIVITY DISORDER, COMBINED TYPE: Primary | ICD-10-CM

## 2024-04-17 DIAGNOSIS — F33.0 MILD EPISODE OF RECURRENT MAJOR DEPRESSIVE DISORDER: ICD-10-CM

## 2024-04-17 DIAGNOSIS — F41.1 GENERALIZED ANXIETY DISORDER: ICD-10-CM

## 2024-04-17 PROCEDURE — 99214 OFFICE O/P EST MOD 30 MIN: CPT

## 2024-04-17 RX ORDER — SERTRALINE HYDROCHLORIDE 25 MG/1
25 TABLET, FILM COATED ORAL DAILY
Qty: 30 TABLET | Refills: 1 | Status: SHIPPED | OUTPATIENT
Start: 2024-04-17

## 2024-04-17 RX ORDER — BUSPIRONE HYDROCHLORIDE 15 MG/1
15 TABLET ORAL 2 TIMES DAILY
Qty: 60 TABLET | Refills: 1 | Status: SHIPPED | OUTPATIENT
Start: 2024-04-17

## 2024-04-17 NOTE — PROGRESS NOTES
This provider is located at [facility], Behavioral [facility], [address] [place] [address], [id number],using a secure MyChart Video Visit through [employer]. Patient is being seen remotely via telehealth at their home address in [place], and stated they are in a secure environment for this session. The patient's condition being diagnosed/treated is appropriate for telemedicine. The provider identified herself as well as her credentials.   The patient, and/or patients guardian, consent to be seen remotely, and when consent is given they understand that the consent allows for patient identifiable information to be sent to a third party as needed.   They may refuse to be seen remotely at any time. The electronic data is encrypted and password protected, and the patient and/or guardian has been advised of the potential risks to privacy not withstanding such measures.    Video Visit    Patient Name: Della Martin  : 1995   MRN: [id number]   Care Team: Patient Care Team:  Julisa Elena APRN as PCP - General (Family Medicine)  Eri Gonzáles APRN as Nurse Practitioner (Behavioral Health)         Chief Complaint:    Chief Complaint   Patient presents with    ADHD    Anxiety    Depression    Med Management       History of Present Illness: Della Martin is a 29 y.o. female who presents via video visit for a medication management follow up. Patient reports that increasing the Adderall XR has been helpful and her focus and concentration have been doing better. She states she is still getting used to it, but overall it has been helpful. She also feels that her mood has been managed well. She does endorse some increased anxiety at times. She denies SI/HI today.     The following portion of the patient's history were reviewed and updated appropriately: allergies, current and past medications, family history, medical history and social history. DURAN reviewed and appropriate.   Subjective    Review of Systems:    Review of Systems   Respiratory: Negative.     Cardiovascular: Negative.  Negative for chest pain and palpitations.   Neurological: Negative.    Psychiatric/Behavioral:  Positive for decreased concentration and stress. The patient is nervous/anxious.    All other systems reviewed and are negative.      Current Medications:   Current Outpatient Medications   Medication Sig Dispense Refill    sertraline (ZOLOFT) 25 MG tablet Take 1 tablet by mouth Daily. 30 tablet 1    amphetamine-dextroamphetamine XR (Adderall XR) 15 MG 24 hr capsule Take 1 capsule by mouth Daily 30 capsule 0    busPIRone (BUSPAR) 15 MG tablet Take 1 tablet by mouth 2 (Two) Times a Day. 60 tablet 1     No current facility-administered medications for this visit.       Mental Status Exam:   Hygiene:    unable to assess   Cooperation:  Cooperative  Eye Contact:  Good  Psychomotor Behavior:   appears to be WNL   Affect:  Appropriate  Mood: normal  Speech:  Normal  Thought Process:  Goal directed and Linear  Thought Content:  Normal and Mood congruent  Suicidal:  None  Homicidal:  None  Hallucinations:  None  Delusion:  None  Memory:  Intact  Orientation:  Person, Place, Time, and Situation  Reliability:  good  Insight:  Good  Judgement:  Good  Impulse Control:  Good  Physical/Medical Issues:  No      Objective   Vital Signs:   There were no vitals taken for this visit.      On 3/18/2024: BP: 116/72      Assessment / Plan    Diagnoses and all orders for this visit:    1. Attention deficit hyperactivity disorder, combined type (Primary)   - Continue Adderall XR  2. Mild episode of recurrent major depressive disorder  -     sertraline (ZOLOFT) 25 MG tablet; Take 1 tablet by mouth Daily.  Dispense: 30 tablet; Refill: 1    3. Generalized anxiety disorder  -     busPIRone (BUSPAR) 15 MG tablet; Take 1 tablet by mouth 2 (Two) Times a Day.  Dispense: 60 tablet; Refill: 1    Will increase Buspar and continue all other medication as  ordered.     As part of patient's treatment plan I am prescribing a controlled substance.  The patient has been made aware of the appropriate use of such medications and potential for dependence and/or overdose.  It has also been made clear that these medications are for use by this patient only, without concomitant use of alcohol or other substances, unless prescribed.    Patient has completed a prescribing agreement detailing terms of continued prescribing of controlled substances, including monitoring DURAN reports, urine drug screening, and pill counts if necessary.  Patient is aware that inappropriate use will result in cessation of prescribing such medications      A psychological evaluation was conducted in order to assess past and current level of functioning. Areas assessed included, but were not limited to: perception of social support, perception of ability to face and deal with challenges in life (positive functioning), anxiety symptoms, depressive symptoms, perspective on beliefs/belief system, coping skills for stress, intelligence level,  Therapeutic rapport was established. Interventions conducted today were geared towards incorporating medication management along with support for continued therapy. Education was also provided as to the med management with this provider and what to expect in subsequent sessions.      We discussed risks, benefits, goals and side effects of the above medication and the patient was agreeable with the plan. Patient was educated on the importance of compliance with treatment and follow-up appointments. Patient is aware to contact the [facility] with any worsening of symptoms. To call for questions or concerns and return early if necessary. Patent is agreeable to go to the Emergency Department or call 911 should they begin SI/HI.        MEDS ORDERED DURING VISIT:  New Medications Ordered This Visit   Medications    busPIRone (BUSPAR) 15 MG tablet     Sig: Take 1  tablet by mouth 2 (Two) Times a Day.     Dispense:  60 tablet     Refill:  1    sertraline (ZOLOFT) 25 MG tablet     Sig: Take 1 tablet by mouth Daily.     Dispense:  30 tablet     Refill:  1         Follow Up   Return in about 6 weeks (around 5/29/2024).  Patient was given instructions and counseling regarding her condition or for health maintenance advice. Please see specific information pulled into the AVS if appropriate.     TREATMENT PLAN/GOALS: Continue supportive psychotherapy efforts and medications as indicated. Treatment and medication options discussed during today's visit. Patient acknowledged and verbally consented to continue with current treatment plan and was educated on the importance of compliance with treatment and follow-up appointments.    MEDICATION ISSUES:  Discussed medication options and treatment plan of prescribed medication as well as the risks, benefits, and side effects including potential falls, possible impaired driving and metabolic adversities among others. Patient is agreeable to call the office with any worsening of symptoms or onset of side effects. Patient is agreeable to call 911 or go to the nearest ER should he/she begin having SI/HI.        TR[facility] D[facility]  [address] [place] [address] [address] [place] [id number] [phone]    April 22, 2024 13:16 EDT

## 2024-05-07 DIAGNOSIS — F90.2 ATTENTION DEFICIT HYPERACTIVITY DISORDER, COMBINED TYPE: Primary | ICD-10-CM

## 2024-05-07 RX ORDER — DEXTROAMPHETAMINE SACCHARATE, AMPHETAMINE ASPARTATE MONOHYDRATE, DEXTROAMPHETAMINE SULFATE AND AMPHETAMINE SULFATE 5; 5; 5; 5 MG/1; MG/1; MG/1; MG/1
20 CAPSULE, EXTENDED RELEASE ORAL DAILY
Qty: 30 CAPSULE | Refills: 0 | Status: SHIPPED | OUTPATIENT
Start: 2024-05-07

## 2024-05-07 RX ORDER — DEXTROAMPHETAMINE SACCHARATE, AMPHETAMINE ASPARTATE MONOHYDRATE, DEXTROAMPHETAMINE SULFATE AND AMPHETAMINE SULFATE 3.75; 3.75; 3.75; 3.75 MG/1; MG/1; MG/1; MG/1
15 CAPSULE, EXTENDED RELEASE ORAL DAILY
Qty: 30 CAPSULE | Refills: 0 | Status: CANCELLED | OUTPATIENT
Start: 2024-05-07

## 2024-05-30 ENCOUNTER — TELEMEDICINE (OUTPATIENT)
Dept: BEHAVIORAL HEALTH | Facility: CLINIC | Age: 29
End: 2024-05-30
Payer: COMMERCIAL

## 2024-05-30 DIAGNOSIS — F90.2 ATTENTION DEFICIT HYPERACTIVITY DISORDER, COMBINED TYPE: Primary | ICD-10-CM

## 2024-05-30 DIAGNOSIS — F41.1 GENERALIZED ANXIETY DISORDER: ICD-10-CM

## 2024-05-30 DIAGNOSIS — F33.0 MILD EPISODE OF RECURRENT MAJOR DEPRESSIVE DISORDER: ICD-10-CM

## 2024-05-30 PROCEDURE — 99214 OFFICE O/P EST MOD 30 MIN: CPT

## 2024-05-30 RX ORDER — DEXTROAMPHETAMINE SACCHARATE, AMPHETAMINE ASPARTATE, DEXTROAMPHETAMINE SULFATE AND AMPHETAMINE SULFATE 3.125; 3.125; 3.125; 3.125 MG/1; MG/1; MG/1; MG/1
12.5 TABLET ORAL DAILY
Qty: 30 TABLET | Refills: 0 | Status: SHIPPED | OUTPATIENT
Start: 2024-05-30

## 2024-05-30 RX ORDER — SERTRALINE HYDROCHLORIDE 25 MG/1
25 TABLET, FILM COATED ORAL DAILY
Qty: 30 TABLET | Refills: 1 | Status: SHIPPED | OUTPATIENT
Start: 2024-05-30

## 2024-05-30 RX ORDER — DEXTROAMPHETAMINE SACCHARATE, AMPHETAMINE ASPARTATE MONOHYDRATE, DEXTROAMPHETAMINE SULFATE AND AMPHETAMINE SULFATE 5; 5; 5; 5 MG/1; MG/1; MG/1; MG/1
20 CAPSULE, EXTENDED RELEASE ORAL DAILY
Qty: 30 CAPSULE | Refills: 0 | Status: SHIPPED | OUTPATIENT
Start: 2024-05-30

## 2024-05-30 NOTE — PROGRESS NOTES
This provider is located at [facility], Behavioral Health, [address] [place] [address], [facility] [place] [facility] [place],using a secure MyChart Video Visit through [employer]. Patient is being seen remotely via telehealth at their home address in [place], and stated they are in a secure environment for this session. The patient's condition being diagnosed/treated is appropriate for telemedicine. The provider identified herself as well as her credentials.   The patient, and/or patients guardian, consent to be seen remotely, and when consent is given they understand that the consent allows for patient identifiable information to be sent to a third party as needed.   They may refuse to be seen remotely at any time. The electronic data is encrypted and password protected, and the patient and/or guardian has been advised of the potential risks to privacy not withstanding such measures.    Video Visit    Patient Name: Della Martin  : 1995   MRN: [id number]   Care Team: Patient Care Team:  Julisa Elena APRN as PCP - General (Family Medicine)  Eri Gonzáles APRN as Nurse Practitioner (Behavioral Health)         Chief Complaint:    Chief Complaint   Patient presents with    ADHD    Anxiety    Depression    Med Management       History of Present Illness: Della Martin is a 29 y.o. female who presents via video visit for a medication management follow up. Patient reports that increasing the Adderall has been helpful and she definitely feels that this has been the most effective dose we have tried. She does feel that it doesn't seem to last very long and can feel it wear off pretty early in the afternoon. She has not started the Buspar yet, however, she does feel that her mood and anxiety have been managed well with the Zoloft alone. She denies SI/HI today.     The following portion of the patient's history were reviewed and updated appropriately: allergies, current and past medications, family history,  medical history and social history. DURAN reviewed and appropriate.   Subjective   Review of Systems:    Review of Systems   Respiratory: Negative.     Cardiovascular: Negative.  Negative for chest pain and palpitations.   Neurological: Negative.    Psychiatric/Behavioral:  Positive for decreased concentration and stress.    All other systems reviewed and are negative.      Current Medications:   Current Outpatient Medications   Medication Sig Dispense Refill    amphetamine-dextroamphetamine XR (ADDERALL XR) 20 MG 24 hr capsule Take 1 capsule by mouth Daily 30 capsule 0    sertraline (ZOLOFT) 25 MG tablet Take 1 tablet by mouth Daily. 30 tablet 1    amphetamine-dextroamphetamine (Adderall) 12.5 MG tablet Take 1 tablet by mouth Daily. 30 tablet 0    busPIRone (BUSPAR) 15 MG tablet Take 1 tablet by mouth 2 (Two) Times a Day. 60 tablet 1     No current facility-administered medications for this visit.       Mental Status Exam:   Hygiene:    unable to assess   Cooperation:  Cooperative  Eye Contact:  Good  Psychomotor Behavior:   appears to be WNL   Affect:  Appropriate  Mood: normal  Speech:  Normal  Thought Process:  Goal directed and Linear  Thought Content:  Normal and Mood congruent  Suicidal:  None  Homicidal:  None  Hallucinations:  None  Delusion:  None  Memory:  Intact  Orientation:  Person, Place, Time, and Situation  Reliability:  good  Insight:  Good  Judgement:  Good  Impulse Control:  Good  Physical/Medical Issues:  No      Objective   Vital Signs:   There were no vitals taken for this visit.      On 3/18/2024  BP: 116/72     On 11/8/2023 P; 70    Assessment / Plan    Diagnoses and all orders for this visit:    1. Attention deficit hyperactivity disorder, combined type (Primary)  -     amphetamine-dextroamphetamine (Adderall) 12.5 MG tablet; Take 1 tablet by mouth Daily.  Dispense: 30 tablet; Refill: 0  -     amphetamine-dextroamphetamine XR (ADDERALL XR) 20 MG 24 hr capsule; Take 1 capsule by mouth Daily   Dispense: 30 capsule; Refill: 0    2. Mild episode of recurrent major depressive disorder  -     sertraline (ZOLOFT) 25 MG tablet; Take 1 tablet by mouth Daily.  Dispense: 30 tablet; Refill: 1    3. Generalized anxiety disorder  -     sertraline (ZOLOFT) 25 MG tablet; Take 1 tablet by mouth Daily.  Dispense: 30 tablet; Refill: 1    Will add an afternoon booster when needed. Continue all other medication as ordered.     As part of patient's treatment plan I am prescribing a controlled substance.  The patient has been made aware of the appropriate use of such medications and potential for dependence and/or overdose.  It has also been made clear that these medications are for use by this patient only, without concomitant use of alcohol or other substances, unless prescribed.    Patient has completed a prescribing agreement detailing terms of continued prescribing of controlled substances, including monitoring DURAN reports, urine drug screening, and pill counts if necessary.  Patient is aware that inappropriate use will result in cessation of prescribing such medications.      A psychological evaluation was conducted in order to assess past and current level of functioning. Areas assessed included, but were not limited to: perception of social support, perception of ability to face and deal with challenges in life (positive functioning), anxiety symptoms, depressive symptoms, perspective on beliefs/belief system, coping skills for stress, intelligence level,  Therapeutic rapport was established. Interventions conducted today were geared towards incorporating medication management along with support for continued therapy. Education was also provided as to the med management with this provider and what to expect in subsequent sessions.      We discussed risks, benefits, goals and side effects of the above medication and the patient was agreeable with the plan. Patient was educated on the importance of compliance with  treatment and follow-up appointments. Patient is aware to contact the [facility] with any worsening of symptoms. To call for questions or concerns and return early if necessary. Patent is agreeable to go to the Emergency Department or call 911 should they begin SI/HI.        MEDS ORDERED DURING VISIT:  New Medications Ordered This Visit   Medications    amphetamine-dextroamphetamine (Adderall) 12.5 MG tablet     Sig: Take 1 tablet by mouth Daily.     Dispense:  30 tablet     Refill:  0    amphetamine-dextroamphetamine XR (ADDERALL XR) 20 MG 24 hr capsule     Sig: Take 1 capsule by mouth Daily     Dispense:  30 capsule     Refill:  0    sertraline (ZOLOFT) 25 MG tablet     Sig: Take 1 tablet by mouth Daily.     Dispense:  30 tablet     Refill:  1         Follow Up   Return in about 8 weeks (around 7/25/2024).  Patient was given instructions and counseling regarding her condition or for health maintenance advice. Please see specific information pulled into the AVS if appropriate.     TREATMENT PLAN/GOALS: Continue supportive psychotherapy efforts and medications as indicated. Treatment and medication options discussed during today's visit. Patient acknowledged and verbally consented to continue with current treatment plan and was educated on the importance of compliance with treatment and follow-up appointments.    MEDICATION ISSUES:  Discussed medication options and treatment plan of prescribed medication as well as the risks, benefits, and side effects including potential falls, possible impaired driving and metabolic adversities among others. Patient is agreeable to call the office with any worsening of symptoms or onset of side effects. Patient is agreeable to call 911 or go to the nearest ER should he/she begin having SI/HI.        TRISH Dillard PC [facility] BE[facility]AVIORAL HEALTH  [address] [place] [address] DR [place] [id number] [phone]    May 30, 2024 12:31 EDT

## 2024-07-01 DIAGNOSIS — F90.2 ATTENTION DEFICIT HYPERACTIVITY DISORDER, COMBINED TYPE: ICD-10-CM

## 2024-07-01 RX ORDER — DEXTROAMPHETAMINE SACCHARATE, AMPHETAMINE ASPARTATE, DEXTROAMPHETAMINE SULFATE AND AMPHETAMINE SULFATE 3.125; 3.125; 3.125; 3.125 MG/1; MG/1; MG/1; MG/1
12.5 TABLET ORAL DAILY
Qty: 30 TABLET | Refills: 0 | Status: SHIPPED | OUTPATIENT
Start: 2024-07-01

## 2024-07-01 NOTE — TELEPHONE ENCOUNTER
Rx Refill Note  Requested Prescriptions     Pending Prescriptions Disp Refills    amphetamine-dextroamphetamine (Adderall) 12.5 MG tablet 30 tablet 0     Sig: Take 1 tablet by mouth Daily.      Last office visit with prescribing clinician: 3/18/2024   Last telemedicine visit with prescribing clinician: 5/30/2024   Next office visit with prescribing clinician: 7/25/2024                         Would you like a call back once the refill request has been completed: [] Yes [] No    If the office needs to give you a call back, can they leave a voicemail: [] Yes [] No    Ariadne Aguilar MA  07/01/24, 13:51 EDT

## 2024-07-11 DIAGNOSIS — F90.2 ATTENTION DEFICIT HYPERACTIVITY DISORDER, COMBINED TYPE: ICD-10-CM

## 2024-07-11 RX ORDER — DEXTROAMPHETAMINE SACCHARATE, AMPHETAMINE ASPARTATE MONOHYDRATE, DEXTROAMPHETAMINE SULFATE AND AMPHETAMINE SULFATE 5; 5; 5; 5 MG/1; MG/1; MG/1; MG/1
20 CAPSULE, EXTENDED RELEASE ORAL DAILY
Qty: 30 CAPSULE | Refills: 0 | Status: SHIPPED | OUTPATIENT
Start: 2024-07-11 | End: 2024-07-11 | Stop reason: SDUPTHER

## 2024-07-11 NOTE — TELEPHONE ENCOUNTER
PATIENT WANTS MEDICINE SENT TO [facility] ON [address] IN [facility]

## 2024-07-11 NOTE — TELEPHONE ENCOUNTER
Requesting [facility] ON [address] IN [facility] [place]  due to stock    Rx Refill Note  Requested Prescriptions     Pending Prescriptions Disp Refills    amphetamine-dextroamphetamine XR (ADDERALL XR) 20 MG 24 hr capsule 30 capsule 0     Sig: Take 1 capsule by mouth Daily     Signed Prescriptions Disp Refills    amphetamine-dextroamphetamine XR (ADDERALL XR) 20 MG 24 hr capsule 30 capsule 0     Sig: Take 1 capsule by mouth Daily     Authorizing Provider: DARIA DOMINGUEZ      Last office visit with prescribing clinician: 3/18/2024   Last telemedicine visit with prescribing clinician: 5/30/2024   Next office visit with prescribing clinician: 7/25/2024                         Would you like a call back once the refill request has been completed: [] Yes [] No    If the office needs to give you a call back, can they leave a voicemail: [] Yes [] No    Ariadne Aguilar MA  07/11/24, 13:16 EDT

## 2024-07-11 NOTE — TELEPHONE ENCOUNTER
Rx Refill Note  Requested Prescriptions     Pending Prescriptions Disp Refills    amphetamine-dextroamphetamine XR (ADDERALL XR) 20 MG 24 hr capsule 30 capsule 0     Sig: Take 1 capsule by mouth Daily      Last office visit with prescribing clinician: 3/18/2024   Last telemedicine visit with prescribing clinician: 5/30/2024   Next office visit with prescribing clinician: 7/25/2024                         Would you like a call back once the refill request has been completed: [] Yes [] No    If the office needs to give you a call back, can they leave a voicemail: [] Yes [] No    Ariadne Aguilar MA  07/11/24, 09:53 EDT

## 2024-07-15 RX ORDER — DEXTROAMPHETAMINE SACCHARATE, AMPHETAMINE ASPARTATE MONOHYDRATE, DEXTROAMPHETAMINE SULFATE AND AMPHETAMINE SULFATE 5; 5; 5; 5 MG/1; MG/1; MG/1; MG/1
20 CAPSULE, EXTENDED RELEASE ORAL DAILY
Qty: 30 CAPSULE | Refills: 0 | Status: SHIPPED | OUTPATIENT
Start: 2024-07-15

## 2024-07-25 ENCOUNTER — TELEMEDICINE (OUTPATIENT)
Dept: BEHAVIORAL HEALTH | Facility: CLINIC | Age: 29
End: 2024-07-25
Payer: COMMERCIAL

## 2024-07-25 DIAGNOSIS — F41.1 GENERALIZED ANXIETY DISORDER: ICD-10-CM

## 2024-07-25 DIAGNOSIS — F33.0 MILD EPISODE OF RECURRENT MAJOR DEPRESSIVE DISORDER: ICD-10-CM

## 2024-07-25 DIAGNOSIS — F90.2 ATTENTION DEFICIT HYPERACTIVITY DISORDER, COMBINED TYPE: Primary | ICD-10-CM

## 2024-07-25 PROCEDURE — 99214 OFFICE O/P EST MOD 30 MIN: CPT

## 2024-07-25 RX ORDER — DEXTROAMPHETAMINE SACCHARATE, AMPHETAMINE ASPARTATE MONOHYDRATE, DEXTROAMPHETAMINE SULFATE AND AMPHETAMINE SULFATE 6.25; 6.25; 6.25; 6.25 MG/1; MG/1; MG/1; MG/1
25 CAPSULE, EXTENDED RELEASE ORAL DAILY
Qty: 30 CAPSULE | Refills: 0 | Status: SHIPPED | OUTPATIENT
Start: 2024-07-25

## 2024-07-25 RX ORDER — SERTRALINE HYDROCHLORIDE 25 MG/1
25 TABLET, FILM COATED ORAL DAILY
Qty: 30 TABLET | Refills: 1 | Status: SHIPPED | OUTPATIENT
Start: 2024-07-25

## 2024-07-25 RX ORDER — BUSPIRONE HYDROCHLORIDE 10 MG/1
10 TABLET ORAL 2 TIMES DAILY
Qty: 60 TABLET | Refills: 1 | Status: SHIPPED | OUTPATIENT
Start: 2024-07-25

## 2024-07-25 NOTE — PROGRESS NOTES
This provider is located at [facility], Behavioral Health, [address] [place] [address], [id number],using a secure MyChart Video Visit through [employer]. Patient is being seen remotely via telehealth at their home address in [place], and stated they are in a secure environment for this session. The patient's condition being diagnosed/treated is appropriate for telemedicine. The provider identified herself as well as her credentials.   The patient, and/or patients guardian, consent to be seen remotely, and when consent is given they understand that the consent allows for patient identifiable information to be sent to a third party as needed.   They may refuse to be seen remotely at any time. The electronic data is encrypted and password protected, and the patient and/or guardian has been advised of the potential risks to privacy not withstanding such measures.    Video Visit    Patient Name: Della Martin  : 1995   MRN: [id number]   Care Team: Patient Care Team:  Julisa Elena APRN as PCP - General (Family Medicine)  Eri Gonzáles APRN as Nurse Practitioner (Behavioral Health)         Chief Complaint:    Chief Complaint   Patient presents with    ADHD    Anxiety    Depression    Med Management       History of Present Illness: Della Martin is a 29 y.o. female who presents via video visit for a medication management follow up. Patient reports feeling that she did not like the immediate release booster and did not feel that it helped with her focus and concentration at all. She does feel that the extended release has been more helpful and feels that she would benefit more from an increase in dose rather than having the booster. She also feel that increasing the Buspar made her drowsy and she did better on the 10 mg. She does feel that the Zoloft continues to be effective and she did not have any concerns in that regard. She denies SI/HI today.     The following portion of the  patient's history were reviewed and updated appropriately: allergies, current and past medications, family history, medical history and social history. DURAN reviewed and appropriate.   Subjective   Review of Systems:    Review of Systems   Respiratory: Negative.     Cardiovascular: Negative.  Negative for chest pain.   Neurological: Negative.    Psychiatric/Behavioral:  Positive for decreased concentration and stress. The patient is nervous/anxious.    All other systems reviewed and are negative.      Current Medications:   Current Outpatient Medications   Medication Sig Dispense Refill    sertraline (ZOLOFT) 25 MG tablet Take 1 tablet by mouth Daily. 30 tablet 1    amphetamine-dextroamphetamine XR (ADDERALL XR) 25 MG 24 hr capsule Take 1 capsule by mouth Daily 30 capsule 0    busPIRone (BUSPAR) 10 MG tablet Take 1 tablet by mouth 2 (Two) Times a Day. 60 tablet 1     No current facility-administered medications for this visit.       Mental Status Exam:   Hygiene:    unable to assess   Cooperation:  Cooperative  Eye Contact:  Good  Psychomotor Behavior:   appears to be WNL   Affect:  Appropriate  Mood: normal and anxious  Speech:  Normal  Thought Process:  Goal directed and Linear  Thought Content:  Normal and Mood congruent  Suicidal:  None  Homicidal:  None  Hallucinations:  None  Delusion:  None  Memory:  Intact  Orientation:  Person, Place, Time, and Situation  Reliability:  good  Insight:  Good  Judgement:  Good  Impulse Control:  Good  Physical/Medical Issues:  Yes see chart       Objective   Vital Signs:   There were no vitals taken for this visit.      On 11/8/2024  BP: 119/80  P: 70    Assessment / Plan    Diagnoses and all orders for this visit:    1. Attention deficit hyperactivity disorder, combined type (Primary)  -     amphetamine-dextroamphetamine XR (ADDERALL XR) 25 MG 24 hr capsule; Take 1 capsule by mouth Daily  Dispense: 30 capsule; Refill: 0    2. Mild episode of recurrent major depressive  disorder  -     sertraline (ZOLOFT) 25 MG tablet; Take 1 tablet by mouth Daily.  Dispense: 30 tablet; Refill: 1    3. Generalized anxiety disorder  -     busPIRone (BUSPAR) 10 MG tablet; Take 1 tablet by mouth 2 (Two) Times a Day.  Dispense: 60 tablet; Refill: 1  -     sertraline (ZOLOFT) 25 MG tablet; Take 1 tablet by mouth Daily.  Dispense: 30 tablet; Refill: 1    Will discontinue Adderall and increase Adderall XR. Will also decrease Buspar back to 10 mg and continue Zoloft as ordered.     As part of patient's treatment plan I am prescribing a controlled substance.  The patient has been made aware of the appropriate use of such medications and potential for dependence and/or overdose.  It has also been made clear that these medications are for use by this patient only, without concomitant use of alcohol or other substances, unless prescribed.    Patient has completed a prescribing agreement detailing terms of continued prescribing of controlled substances, including monitoring DURAN reports, urine drug screening, and pill counts if necessary.  Patient is aware that inappropriate use will result in cessation of prescribing such medications.    A psychological evaluation was conducted in order to assess past and current level of functioning. Areas assessed included, but were not limited to: perception of social support, perception of ability to face and deal with challenges in life (positive functioning), anxiety symptoms, depressive symptoms, perspective on beliefs/belief system, coping skills for stress, intelligence level,  Therapeutic rapport was established. Interventions conducted today were geared towards incorporating medication management along with support for continued therapy. Education was also provided as to the med management with this provider and what to expect in subsequent sessions.      We discussed risks, benefits, goals and side effects of the above medication and the patient was agreeable  with the plan. Patient was educated on the importance of compliance with treatment and follow-up appointments. Patient is aware to contact the [facility] with any worsening of symptoms. To call for questions or concerns and return early if necessary. Patent is agreeable to go to the Emergency Department or call 911 should they begin SI/HI.        MEDS ORDERED DURING VISIT:  New Medications Ordered This Visit   Medications    amphetamine-dextroamphetamine XR (ADDERALL XR) 25 MG 24 hr capsule     Sig: Take 1 capsule by mouth Daily     Dispense:  30 capsule     Refill:  0    busPIRone (BUSPAR) 10 MG tablet     Sig: Take 1 tablet by mouth 2 (Two) Times a Day.     Dispense:  60 tablet     Refill:  1    sertraline (ZOLOFT) 25 MG tablet     Sig: Take 1 tablet by mouth Daily.     Dispense:  30 tablet     Refill:  1         Follow Up   Return in about 8 weeks (around 9/19/2024).  Patient was given instructions and counseling regarding her condition or for health maintenance advice. Please see specific information pulled into the AVS if appropriate.     TREATMENT PLAN/GOALS: Continue supportive psychotherapy efforts and medications as indicated. Treatment and medication options discussed during today's visit. Patient acknowledged and verbally consented to continue with current treatment plan and was educated on the importance of compliance with treatment and follow-up appointments.    MEDICATION ISSUES:  Discussed medication options and treatment plan of prescribed medication as well as the risks, benefits, and side effects including potential falls, possible impaired driving and metabolic adversities among others. Patient is agreeable to call the office with any worsening of symptoms or onset of side effects. Patient is agreeable to call 911 or go to the nearest ER should he/she begin having SI/HI.        TRISH Dillard PC BE[facility] BEHAVIORAL HEALTH  [address] [place] [address]  [place] [address]  [phone]    July 25, 2024 11:40 EDT

## 2024-07-27 NOTE — OP NOTE
Fam Hughesdham  : 1995  MRN: [id number]  CSN: [id number]    Operative Report    Pre-Operative Dx:   IUP at 39w0d weeks   Elective - patient's request  Desires sterilization      Postoperative dx:    Same as above     Procedure: Primary with bilateral placement of filshie clips       Surgeon: Jaylen Kim MD   Assistant: staff  was responsible for performing the following activities: Retraction and Placing Dressing and their skilled assistance was necessary for the success of this case.         Anesthesia: Spinal        EBL: <950 mls.       Antibiotics: clindamycin 900 mgs and gentamicin 5 mg/kg     Drains: Sanders     Findings: VMI, 3VC, intact placenta, normal adnexa       Indications: Patient 28-year-old G2, P1 presenting at 39 weeks was desired elective primary  section.  Also desired permanent sterilization.  Risks benefits alternatives including failure, regret, permanence were discussed.  All questions answered.           Procedure Details:   After the appropriate time out, the patient was prepped and draped in the usual sterile fashion.  She had been placed in the dorsal supine left lateral tilt position.  A sanders catheter had been placed in the bladder for drainage during the procedure. A pfannenstiel skin incision was made with a knife and carried down to the fascia.  The fascia was nicked with a scalpel and the incision was extended bilaterally with curved Fried scissors. The superior aspect of the fascia was grasped with Kocher clamps x 2 and bluntly as well as sharply dissected away from the underlying rectus muscles.  A similar process was repeated inferiorly. The rectus muscles were  and the peritoneal cavity was entered sharply without complications. The bladder flap was created with Metzenbaum scissors and pickups with teeth.  The  retractor was placed and after checking for no entrapment, was retracted down.  A transverse uterine incision was  made with the knife and extended bilaterally.  The infant was delivered from the vertex presentation.  The mouth and nose were bulb suctioned.  The cord was doubly clamped and cut and the infant handed to the pediatric nurse in attendance.  Cord blood was obtained.  The placenta was manually extracted from the uterus.  The uterus was then elevated from the abdomen and wiped free of remaining membranes.  The uterine incision was closed with #1 chromic in a running locking fashion with a second imbricating stitch.  The uterus had been returned to the abdomen.  The lateral gutters were wiped free of remaining clots.  The site of surgical incision was inspected and noted to be hemostatic.  Tubes were traced to fimbriated ends.  Filshie clips x2 were placed in close approximation bilaterally transverse to the long axis of the tube without incident.  The  retractor was removed.  The subfascial tissue was inspected and noted to be hemostatic.  Peritoneum was closed with 3-0 Vicryl suture in a running fashion.  The fascia was closed with 0 PDS in a running non-locking fashion.  Subcutaneous tissue was inspected and noted to be hemostatic with minimal bovie electrocautery.  Kye's fascia was closed with 3-0 Vicryl in a running non-locking fashion.  The skin was approximated with 4-0 Vicryl on a James needle in a running subcuticular fashion.  Glue Steris placed.. Dressings were placed.  All instrument and sponge counts were correct at the end of the procedure. The patient tolerated the procedure well.  There were no complications.  She was taken to postoperative recovery room in stable condition.          Complications:   None      Disposition:   Mother to Mother Baby/Postpartum  in stable condition currently.   Baby to NBN  in stable condition currently.     Jaylen Kim MD  10/10/2023  13:29 EDT     Patient

## 2024-08-12 DIAGNOSIS — F90.2 ATTENTION DEFICIT HYPERACTIVITY DISORDER, COMBINED TYPE: ICD-10-CM

## 2024-08-12 RX ORDER — DEXTROAMPHETAMINE SACCHARATE, AMPHETAMINE ASPARTATE MONOHYDRATE, DEXTROAMPHETAMINE SULFATE AND AMPHETAMINE SULFATE 6.25; 6.25; 6.25; 6.25 MG/1; MG/1; MG/1; MG/1
25 CAPSULE, EXTENDED RELEASE ORAL DAILY
Qty: 30 CAPSULE | Refills: 0 | Status: SHIPPED | OUTPATIENT
Start: 2024-08-12

## 2024-08-12 NOTE — TELEPHONE ENCOUNTER
[address]     Rx Refill Note  Requested Prescriptions     Pending Prescriptions Disp Refills    amphetamine-dextroamphetamine XR (ADDERALL XR) 25 MG 24 hr capsule 30 capsule 0     Sig: Take 1 capsule by mouth Daily      Last office visit with prescribing clinician: 3/18/2024   Last telemedicine visit with prescribing clinician: 7/25/2024   Next office visit with prescribing clinician: 9/19/2024                         Would you like a call back once the refill request has been completed: [] Yes [] No    If the office needs to give you a call back, can they leave a voicemail: [] Yes [] No    Ariadne Aguilar MA  08/12/24, 14:35 EDT

## 2024-09-12 DIAGNOSIS — F90.2 ATTENTION DEFICIT HYPERACTIVITY DISORDER, COMBINED TYPE: ICD-10-CM

## 2024-09-12 RX ORDER — DEXTROAMPHETAMINE SACCHARATE, AMPHETAMINE ASPARTATE MONOHYDRATE, DEXTROAMPHETAMINE SULFATE AND AMPHETAMINE SULFATE 6.25; 6.25; 6.25; 6.25 MG/1; MG/1; MG/1; MG/1
25 CAPSULE, EXTENDED RELEASE ORAL DAILY
Qty: 30 CAPSULE | Refills: 0 | Status: SHIPPED | OUTPATIENT
Start: 2024-09-12

## 2024-09-12 NOTE — TELEPHONE ENCOUNTER
[facility] ON [address] due to stock    Rx Refill Note  Requested Prescriptions     Pending Prescriptions Disp Refills    amphetamine-dextroamphetamine XR (ADDERALL XR) 25 MG 24 hr capsule 30 capsule 0     Sig: Take 1 capsule by mouth Daily      Last office visit with prescribing clinician: 3/18/2024   Last telemedicine visit with prescribing clinician: 7/25/2024   Next office visit with prescribing clinician: 10/14/2024                         Would you like a call back once the refill request has been completed: [] Yes [] No    If the office needs to give you a call back, can they leave a voicemail: [] Yes [] No    Ariadne Aguilar MA  09/12/24, 10:27 EDT

## 2024-10-14 ENCOUNTER — TELEMEDICINE (OUTPATIENT)
Dept: BEHAVIORAL HEALTH | Facility: CLINIC | Age: 29
End: 2024-10-14
Payer: COMMERCIAL

## 2024-10-14 DIAGNOSIS — F41.1 GENERALIZED ANXIETY DISORDER: ICD-10-CM

## 2024-10-14 DIAGNOSIS — F90.2 ATTENTION DEFICIT HYPERACTIVITY DISORDER, COMBINED TYPE: Primary | ICD-10-CM

## 2024-10-14 DIAGNOSIS — F33.0 MILD EPISODE OF RECURRENT MAJOR DEPRESSIVE DISORDER: ICD-10-CM

## 2024-10-14 PROCEDURE — 99214 OFFICE O/P EST MOD 30 MIN: CPT

## 2024-10-14 RX ORDER — BUSPIRONE HYDROCHLORIDE 5 MG/1
5 TABLET ORAL 2 TIMES DAILY
Qty: 60 TABLET | Refills: 1 | Status: SHIPPED | OUTPATIENT
Start: 2024-10-14

## 2024-10-14 RX ORDER — DEXTROAMPHETAMINE SACCHARATE, AMPHETAMINE ASPARTATE MONOHYDRATE, DEXTROAMPHETAMINE SULFATE AND AMPHETAMINE SULFATE 7.5; 7.5; 7.5; 7.5 MG/1; MG/1; MG/1; MG/1
30 CAPSULE, EXTENDED RELEASE ORAL DAILY
Qty: 30 CAPSULE | Refills: 0 | Status: SHIPPED | OUTPATIENT
Start: 2024-10-14

## 2024-10-14 NOTE — PROGRESS NOTES
"This provider is located at [facility], Behavioral Health, [address] [place] [address], [id number],using a secure MyChart Video Visit through [employer]. Patient is being seen remotely via telehealth at their home address in [place], and stated they are in a secure environment for this session. The patient's condition being diagnosed/treated is appropriate for telemedicine. The provider identified herself as well as her credentials.   The patient, and/or patients guardian, consent to be seen remotely, and when consent is given they understand that the consent allows for patient identifiable information to be sent to a third party as needed.   They may refuse to be seen remotely at any time. The electronic data is encrypted and password protected, and the patient and/or guardian has been advised of the potential risks to privacy not withstanding such measures.    Video Visit    Patient Name: Della Martin  : 1995   MRN: [id number]   Care Team: Patient Care Team:  Julisa Elena APRN as PCP - General (Family Medicine)  Eri Gonzáles APRN as Nurse Practitioner (Behavioral Health)         Chief Complaint:    Chief Complaint   Patient presents with    ADHD    Anxiety    Depression    Med Management       History of Present Illness: Della Martin is a 29 y.o. female who presents via video visit for a medication management follow up. Patient reports that she stopped taking the Zoloft and her PCP started her on Wellbutrin and that has been \"good so far\". She does feel that she gets pretty dizzy since increasing her Buspar and feels that her Adderall XR is still wearing off pretty early in the afternoon/evening. She denies SI/HI today.     The following portion of the patient's history were reviewed and updated appropriately: allergies, current and past medications, family history, medical history and social history. DURAN reviewed and appropriate.   Subjective   Review of Systems:  "   Review of Systems   Respiratory: Negative.     Cardiovascular: Negative.  Negative for chest pain and palpitations.   Neurological: Negative.    Psychiatric/Behavioral:  Positive for decreased concentration and stress. The patient is nervous/anxious.    All other systems reviewed and are negative.      Current Medications:   Current Outpatient Medications   Medication Sig Dispense Refill    amphetamine-dextroamphetamine XR (ADDERALL XR) 30 MG 24 hr capsule Take 1 capsule by mouth Daily 30 capsule 0    busPIRone (BUSPAR) 5 MG tablet Take 1 tablet by mouth 2 (Two) Times a Day. 60 tablet 1     No current facility-administered medications for this visit.       Mental Status Exam:   Hygiene:    unable to assess   Cooperation:  Cooperative  Eye Contact:  Good  Psychomotor Behavior:   appears to be WNL   Affect:  Appropriate  Mood: normal  Speech:  Normal  Thought Process:  Goal directed and Linear  Thought Content:  Normal and Mood congruent  Suicidal:  None  Homicidal:  None  Hallucinations:  None  Delusion:  None  Memory:  Intact  Orientation:  Person, Place, Time, and Situation  Reliability:  good  Insight:  Good  Judgement:  Good  Impulse Control:  Good  Physical/Medical Issues:  Yes see chart       Objective   Vital Signs:   There were no vitals taken for this visit.       On 11/8/2023  BP: 119/80  P: 70    Assessment / Plan    Diagnoses and all orders for this visit:    1. Attention deficit hyperactivity disorder, combined type (Primary)  -     amphetamine-dextroamphetamine XR (ADDERALL XR) 30 MG 24 hr capsule; Take 1 capsule by mouth Daily  Dispense: 30 capsule; Refill: 0    2. Generalized anxiety disorder  -     busPIRone (BUSPAR) 5 MG tablet; Take 1 tablet by mouth 2 (Two) Times a Day.  Dispense: 60 tablet; Refill: 1    3. Mild episode of recurrent major depressive disorder    Will discontinue Zoloft, increase Adderall XR and decrease Buspar. Continue Wellbutrin as ordered by PCP.     As part of patient's  treatment plan I am prescribing a controlled substance.  The patient has been made aware of the appropriate use of such medications and potential for dependence and/or overdose.  It has also been made clear that these medications are for use by this patient only, without concomitant use of alcohol or other substances, unless prescribed.    Patient has completed a prescribing agreement detailing terms of continued prescribing of controlled substances, including monitoring DURAN reports, urine drug screening, and pill counts if necessary.  Patient is aware that inappropriate use will result in cessation of prescribing such medications    A psychological evaluation was conducted in order to assess past and current level of functioning. Areas assessed included, but were not limited to: perception of social support, perception of ability to face and deal with challenges in life (positive functioning), anxiety symptoms, depressive symptoms, perspective on beliefs/belief system, coping skills for stress, intelligence level,  Therapeutic rapport was established. Interventions conducted today were geared towards incorporating medication management along with support for continued therapy. Education was also provided as to the med management with this provider and what to expect in subsequent sessions.      We discussed risks, benefits, goals and side effects of the above medication and the patient was agreeable with the plan. Patient was educated on the importance of compliance with treatment and follow-up appointments. Patient is aware to contact the [facility] with any worsening of symptoms. To call for questions or concerns and return early if necessary. Patent is agreeable to go to the Emergency Department or call 911 should they begin SI/HI.        MEDS ORDERED DURING VISIT:  New Medications Ordered This Visit   Medications    busPIRone (BUSPAR) 5 MG tablet     Sig: Take 1 tablet by mouth 2 (Two) Times a Day.      Dispense:  60 tablet     Refill:  1    amphetamine-dextroamphetamine XR (ADDERALL XR) 30 MG 24 hr capsule     Sig: Take 1 capsule by mouth Daily     Dispense:  30 capsule     Refill:  0         Follow Up   Return in about 8 weeks (around 12/9/2024).  Patient was given instructions and counseling regarding her condition or for health maintenance advice. Please see specific information pulled into the AVS if appropriate.     TREATMENT PLAN/GOALS: Continue supportive psychotherapy efforts and medications as indicated. Treatment and medication options discussed during today's visit. Patient acknowledged and verbally consented to continue with current treatment plan and was educated on the importance of compliance with treatment and follow-up appointments.    MEDICATION ISSUES:  Discussed medication options and treatment plan of prescribed medication as well as the risks, benefits, and side effects including potential falls, possible impaired driving and metabolic adversities among others. Patient is agreeable to call the office with any worsening of symptoms or onset of side effects. Patient is agreeable to call 911 or go to the nearest ER should he/she begin having SI/HI.        TRISH Dillard [facility]  [address] [place] [address] [address] [place] [id number] [phone]    October 14, 2024 11:55 EDT

## 2024-11-14 DIAGNOSIS — F90.2 ATTENTION DEFICIT HYPERACTIVITY DISORDER, COMBINED TYPE: ICD-10-CM

## 2024-11-14 RX ORDER — DEXTROAMPHETAMINE SACCHARATE, AMPHETAMINE ASPARTATE MONOHYDRATE, DEXTROAMPHETAMINE SULFATE AND AMPHETAMINE SULFATE 7.5; 7.5; 7.5; 7.5 MG/1; MG/1; MG/1; MG/1
30 CAPSULE, EXTENDED RELEASE ORAL DAILY
Qty: 30 CAPSULE | Refills: 0 | Status: SHIPPED | OUTPATIENT
Start: 2024-11-14

## 2024-12-09 ENCOUNTER — TELEMEDICINE (OUTPATIENT)
Dept: BEHAVIORAL HEALTH | Facility: CLINIC | Age: 29
End: 2024-12-09
Payer: COMMERCIAL

## 2024-12-09 DIAGNOSIS — F41.1 GENERALIZED ANXIETY DISORDER: ICD-10-CM

## 2024-12-09 DIAGNOSIS — F33.0 MILD EPISODE OF RECURRENT MAJOR DEPRESSIVE DISORDER: ICD-10-CM

## 2024-12-09 DIAGNOSIS — F90.2 ATTENTION DEFICIT HYPERACTIVITY DISORDER, COMBINED TYPE: Primary | ICD-10-CM

## 2024-12-09 PROCEDURE — 99214 OFFICE O/P EST MOD 30 MIN: CPT

## 2024-12-09 RX ORDER — BUSPIRONE HYDROCHLORIDE 5 MG/1
5 TABLET ORAL 2 TIMES DAILY
Qty: 60 TABLET | Refills: 2 | Status: SHIPPED | OUTPATIENT
Start: 2024-12-09

## 2024-12-09 RX ORDER — DEXTROAMPHETAMINE SACCHARATE, AMPHETAMINE ASPARTATE MONOHYDRATE, DEXTROAMPHETAMINE SULFATE AND AMPHETAMINE SULFATE 7.5; 7.5; 7.5; 7.5 MG/1; MG/1; MG/1; MG/1
30 CAPSULE, EXTENDED RELEASE ORAL DAILY
Qty: 30 CAPSULE | Refills: 0 | Status: SHIPPED | OUTPATIENT
Start: 2024-12-09

## 2024-12-09 NOTE — PROGRESS NOTES
This provider is located at [facility], Behavioral Health, [address] [place] [address], [address],using a secure [email or web address] Video Visit through [employer]. Patient is being seen remotely via telehealth at their home address in [place], and stated they are in a secure environment for this session. The patient's condition being diagnosed/treated is appropriate for telemedicine. The provider identified herself as well as her credentials.   The patient, and/or patients guardian, consent to be seen remotely, and when consent is given they understand that the consent allows for patient identifiable information to be sent to a third party as needed.   They may refuse to be seen remotely at any time. The electronic data is encrypted and password protected, and the patient and/or guardian has been advised of the potential risks to privacy not withstanding such measures.    Video Visit    Patient Name: Della Martin  : 1995   MRN: [id number]   Care Team: Patient Care Team:  Julisa Elena APRN as PCP - General (Family Medicine)  Eri Gonzáles APRN as Nurse Practitioner (Behavioral Health)         Chief Complaint:    Chief Complaint   Patient presents with    ADHD    Anxiety    Depression    Med Management       History of Present Illness: Della Martin is a 29 y.o. female who presents via video visit for a medication management follow up. Patient reports that overall things have been going well. She feels that increasing her Adderall has been effective and her focus and concentration have been doing well. She feels that this dose has been lasting throughout the day. She also feels that the Buspar has been managing her anxiety well. She states she stopped the Wellbutrin that her PCP ordered because it was making her more irritable. She has been fine without it. She did not see the need to make any changes and did not have any medication concerns at today's visit. She denies SI/HI today.      The following portion of the patient's history were reviewed and updated appropriately: allergies, current and past medications, family history, medical history and social history. DURAN reviewed and appropriate.   Subjective   Review of Systems:    Review of Systems   Respiratory: Negative.     Cardiovascular: Negative.  Negative for chest pain and palpitations.   Neurological: Negative.    Psychiatric/Behavioral: Negative.     All other systems reviewed and are negative.      Current Medications:   Current Outpatient Medications   Medication Sig Dispense Refill    amphetamine-dextroamphetamine XR (ADDERALL XR) 30 MG 24 hr capsule Take 1 capsule by mouth Daily 30 capsule 0    busPIRone (BUSPAR) 5 MG tablet Take 1 tablet by mouth 2 (Two) Times a Day. 60 tablet 2     No current facility-administered medications for this visit.       Mental Status Exam:   Hygiene:    appears to be WNL   Cooperation:  Cooperative  Eye Contact:  Good  Psychomotor Behavior:   appears to be WNL   Affect:  Appropriate  Mood: normal  Speech:  Normal  Thought Process:  Goal directed and Linear  Thought Content:  Normal and Mood congruent  Suicidal:  None  Homicidal:  None  Hallucinations:  None  Delusion:  None  Memory:  Intact  Orientation:  Person, Place, Time, and Situation  Reliability:  good  Insight:  Good  Judgement:  Good  Impulse Control:  Good  Physical/Medical Issues:  Yes see chart       Objective   Vital Signs:   There were no vitals taken for this visit.      On 3/18/2024  BP/; 116/72     Lab Results:   No visits with results within 3 Month(s) from this visit.   Latest known visit with results is:   Office Visit on 03/18/2024   Component Date Value Ref Range Status    Report Summary 03/18/2024 FINAL   Final    Comment: ====================================================================  [facility] COMP DRUG ANALYSIS,[facility]  ====================================================================  Test                              Result       Flag       Units  Drug Absent but Declared for Prescription Verification    Amphetamine                    Not Detected UNEXPECTED ng/mg creat    Sertraline                     Not Detected UNEXPECTED  ====================================================================  Test                      Result    Flag   Units      Ref Range    Creatinine              30               mg/dL      >=20  ====================================================================  Declared Medications:   The flagging and interpretation on this report are based on the   following declared medications.  Unexpected results may arise from   inaccuracies in the declared medications.   **Note: The testing scope of this panel includes these medications:   Amphetamine (Adderall XR)   Sertraline                              **Note: The testing scope of this panel does not include following   reported medications:   Buspirone (BuSpar)  ====================================================================  For clinical consultation, please call [phone].  ====================================================================        Assessment / Plan    Diagnoses and all orders for this visit:    1. Attention deficit hyperactivity disorder, combined type (Primary)  -     amphetamine-dextroamphetamine XR (ADDERALL XR) 30 MG 24 hr capsule; Take 1 capsule by mouth Daily  Dispense: 30 capsule; Refill: 0    2. Generalized anxiety disorder  -     busPIRone (BUSPAR) 5 MG tablet; Take 1 tablet by mouth 2 (Two) Times a Day.  Dispense: 60 tablet; Refill: 2    3. Mild episode of recurrent major depressive disorder    Patient appears to be doing well on current medication. No issues reported and no indication for change. Will continue medication as ordered.     As part of patient's treatment plan I am prescribing a controlled substance.  The patient has been made aware of the appropriate use of such medications and potential for dependence  and/or overdose.  It has also been made clear that these medications are for use by this patient only, without concomitant use of alcohol or other substances, unless prescribed.    Patient has completed a prescribing agreement detailing terms of continued prescribing of controlled substances, including monitoring DURAN reports, urine drug screening, and pill counts if necessary.  Patient is aware that inappropriate use will result in cessation of prescribing such medications.    A psychological evaluation was conducted in order to assess past and current level of functioning. Areas assessed included, but were not limited to: perception of social support, perception of ability to face and deal with challenges in life (positive functioning), anxiety symptoms, depressive symptoms, perspective on beliefs/belief system, coping skills for stress, intelligence level,  Therapeutic rapport was established. Interventions conducted today were geared towards incorporating medication management along with support for continued therapy. Education was also provided as to the med management with this provider and what to expect in subsequent sessions.      We discussed risks, benefits, goals and side effects of the above medication and the patient was agreeable with the plan. Patient was educated on the importance of compliance with treatment and follow-up appointments. Patient is aware to contact the [facility] with any worsening of symptoms. To call for questions or concerns and return early if necessary. Patent is agreeable to go to the Emergency Department or call 911 should they begin SI/HI.        MEDS ORDERED DURING VISIT:  New Medications Ordered This Visit   Medications    amphetamine-dextroamphetamine XR (ADDERALL XR) 30 MG 24 hr capsule     Sig: Take 1 capsule by mouth Daily     Dispense:  30 capsule     Refill:  0    busPIRone (BUSPAR) 5 MG tablet     Sig: Take 1 tablet by mouth 2 (Two) Times a Day.     Dispense:   60 tablet     Refill:  2         Follow Up   Return in about 3 months (around 3/9/2025).  Patient was given instructions and counseling regarding her condition or for health maintenance advice. Please see specific information pulled into the AVS if appropriate.     TREATMENT PLAN/GOALS: Continue supportive psychotherapy efforts and medications as indicated. Treatment and medication options discussed during today's visit. Patient acknowledged and verbally consented to continue with current treatment plan and was educated on the importance of compliance with treatment and follow-up appointments.    MEDICATION ISSUES:  Discussed medication options and treatment plan of prescribed medication as well as the risks, benefits, and side effects including potential falls, possible impaired driving and metabolic adversities among others. Patient is agreeable to call the office with any worsening of symptoms or onset of side effects. Patient is agreeable to call 911 or go to the nearest ER should he/she begin having SI/HI.        TRISH Dillard PC [facility]  [address] [place] [id number] [phone]    December 9, 2024 11:57 EST

## 2025-01-16 DIAGNOSIS — F90.2 ATTENTION DEFICIT HYPERACTIVITY DISORDER, COMBINED TYPE: ICD-10-CM

## 2025-01-16 RX ORDER — DEXTROAMPHETAMINE SACCHARATE, AMPHETAMINE ASPARTATE MONOHYDRATE, DEXTROAMPHETAMINE SULFATE AND AMPHETAMINE SULFATE 7.5; 7.5; 7.5; 7.5 MG/1; MG/1; MG/1; MG/1
30 CAPSULE, EXTENDED RELEASE ORAL DAILY
Qty: 30 CAPSULE | Refills: 0 | Status: SHIPPED | OUTPATIENT
Start: 2025-01-16

## 2025-01-16 NOTE — TELEPHONE ENCOUNTER
Rx Refill Note  Requested Prescriptions     Pending Prescriptions Disp Refills    amphetamine-dextroamphetamine XR (ADDERALL XR) 30 MG 24 hr capsule 30 capsule 0     Sig: Take 1 capsule by mouth Daily      Last office visit with prescribing clinician: 3/18/2024   Last telemedicine visit with prescribing clinician: 12/9/2024   Next office visit with prescribing clinician: 3/10/2025                         Would you like a call back once the refill request has been completed: [] Yes [] No    If the office needs to give you a call back, can they leave a voicemail: [] Yes [] No    Hayde Delgado MA  01/16/25, 10:35 EST

## 2025-02-13 DIAGNOSIS — F90.2 ATTENTION DEFICIT HYPERACTIVITY DISORDER, COMBINED TYPE: ICD-10-CM

## 2025-02-13 RX ORDER — DEXTROAMPHETAMINE SACCHARATE, AMPHETAMINE ASPARTATE MONOHYDRATE, DEXTROAMPHETAMINE SULFATE AND AMPHETAMINE SULFATE 7.5; 7.5; 7.5; 7.5 MG/1; MG/1; MG/1; MG/1
30 CAPSULE, EXTENDED RELEASE ORAL DAILY
Qty: 30 CAPSULE | Refills: 0 | Status: SHIPPED | OUTPATIENT
Start: 2025-02-13

## 2025-03-12 DIAGNOSIS — F90.2 ATTENTION DEFICIT HYPERACTIVITY DISORDER, COMBINED TYPE: ICD-10-CM

## 2025-03-12 RX ORDER — DEXTROAMPHETAMINE SACCHARATE, AMPHETAMINE ASPARTATE MONOHYDRATE, DEXTROAMPHETAMINE SULFATE AND AMPHETAMINE SULFATE 7.5; 7.5; 7.5; 7.5 MG/1; MG/1; MG/1; MG/1
30 CAPSULE, EXTENDED RELEASE ORAL DAILY
Qty: 30 CAPSULE | Refills: 0 | Status: SHIPPED | OUTPATIENT
Start: 2025-03-12

## 2025-04-10 DIAGNOSIS — F90.2 ATTENTION DEFICIT HYPERACTIVITY DISORDER, COMBINED TYPE: ICD-10-CM

## 2025-04-14 RX ORDER — DEXTROAMPHETAMINE SACCHARATE, AMPHETAMINE ASPARTATE MONOHYDRATE, DEXTROAMPHETAMINE SULFATE AND AMPHETAMINE SULFATE 7.5; 7.5; 7.5; 7.5 MG/1; MG/1; MG/1; MG/1
30 CAPSULE, EXTENDED RELEASE ORAL DAILY
Qty: 30 CAPSULE | Refills: 0 | Status: SHIPPED | OUTPATIENT
Start: 2025-04-14

## 2025-05-06 ENCOUNTER — OFFICE VISIT (OUTPATIENT)
Dept: BEHAVIORAL HEALTH | Facility: CLINIC | Age: 30
End: 2025-05-06
Payer: COMMERCIAL

## 2025-05-06 VITALS
WEIGHT: 124 LBS | SYSTOLIC BLOOD PRESSURE: 124 MMHG | HEIGHT: 63 IN | BODY MASS INDEX: 21.97 KG/M2 | HEART RATE: 83 BPM | DIASTOLIC BLOOD PRESSURE: 80 MMHG | OXYGEN SATURATION: 97 %

## 2025-05-06 DIAGNOSIS — Z51.81 ENCOUNTER FOR THERAPEUTIC DRUG MONITORING: ICD-10-CM

## 2025-05-06 DIAGNOSIS — F90.2 ATTENTION DEFICIT HYPERACTIVITY DISORDER, COMBINED TYPE: Primary | ICD-10-CM

## 2025-05-06 DIAGNOSIS — F41.1 GENERALIZED ANXIETY DISORDER: ICD-10-CM

## 2025-05-06 DIAGNOSIS — F33.0 MILD EPISODE OF RECURRENT MAJOR DEPRESSIVE DISORDER: ICD-10-CM

## 2025-05-06 RX ORDER — LISDEXAMFETAMINE DIMESYLATE 30 MG/1
30 CAPSULE ORAL EVERY MORNING
Qty: 30 CAPSULE | Refills: 0 | Status: SHIPPED | OUTPATIENT
Start: 2025-05-06

## 2025-05-06 NOTE — PROGRESS NOTES
Follow Up Office Visit    Patient Name: Della Martin  : 1995   MRN: [id number]   Care Team: Patient Care Team:  Julisa Elena APRN as PCP - General (Family Medicine)  Eri Gonzáles APRN as Nurse Practitioner (Behavioral Health)         Chief Complaint:    Chief Complaint   Patient presents with    ADHD    Anxiety    Depression    Med Management       History of Present Illness: Della Martin is a 30 y.o. female who is here today for a medication management follow up. Patient reports feeling that the Adderall XR doesn't seem to last as long as it once did and she finds herself needing to take it later in the day in an effort to get it to last into the evening. She does feel that it helps with her focus and concentration when it is effective. She also feels that the Zoloft continues to be effective and her anxiety and mood have been managed well. She denies SI/HI today.     The following portion of the patient's history were reviewed and updated appropriately: allergies, current and past medications, family history, medical history and social history. DURAN reviewed and appropriate.   Subjective   Review of Systems:    Review of Systems   Constitutional:  Negative for chills, diaphoresis, fatigue and fever.   HENT:  Negative for congestion, sore throat and swollen glands.    Respiratory: Negative.  Negative for cough.    Cardiovascular: Negative.  Negative for chest pain and palpitations.   Gastrointestinal:  Negative for abdominal pain, nausea and vomiting.   Genitourinary:  Negative for dysuria.   Musculoskeletal:  Negative for myalgias and neck pain.   Skin:  Negative for rash.   Neurological: Negative.  Negative for weakness and numbness.   Psychiatric/Behavioral:  Positive for decreased concentration.    All other systems reviewed and are negative.      Current Medications:   Current Outpatient Medications   Medication Sig Dispense Refill    sertraline (ZOLOFT) 50 MG tablet Take 1  "tablet by mouth Daily. 30 tablet 1    lisdexamfetamine (Vyvanse) 30 MG capsule Take 1 capsule by mouth Every Morning 30 capsule 0     No current facility-administered medications for this visit.       Mental Status Exam:   Hygiene:   good  Cooperation:  Cooperative  Eye Contact:  Good  Psychomotor Behavior:  Appropriate  Affect:  Appropriate  Mood: normal  Speech:  Normal  Thought Process:  Goal directed and Linear  Thought Content:  Normal and Mood congruent  Suicidal:  None  Homicidal:  None  Hallucinations:  None  Delusion:  None  Memory:  Intact  Orientation:  Person, Place, Time, and Situation  Reliability:  good  Insight:  Good  Judgement:  Good  Impulse Control:  Good  Physical/Medical Issues:  Yes see chart       Objective   Vital Signs:   /80   Pulse 83   Ht 160 cm (63\")   Wt 56.2 kg (124 lb)   SpO2 97%   BMI 21.97 kg/m²         Lab Results:   Lab Results   Component Value Date    WBC 5.0 02/12/2024    HGB 13.1 02/12/2024    HCT 39.5 02/12/2024    MCV 90.4 02/12/2024     02/12/2024        Lab Results   Component Value Date    GLUCOSE 92 11/08/2023    BUN 12 11/08/2023    CREATININE 0.69 11/08/2023     11/08/2023    K 4.0 11/08/2023     11/08/2023    CALCIUM 9.3 11/08/2023    PROTEINTOT 7.6 11/08/2023    ALBUMIN 4.6 11/08/2023    ALT 30 11/08/2023    AST 21 11/08/2023    ALKPHOS 99 11/08/2023    BILITOT 0.3 11/08/2023    GLOB 3.0 11/08/2023    AGRATIO 1.5 11/08/2023    BCR 17.4 11/08/2023    ANIONGAP 9.5 11/08/2023    EGFR 122.9 11/08/2023        Lab Results   Component Value Date    CHLPL 178 02/12/2024    TRIG 101 02/12/2024    HDL 44 02/12/2024     (H) 02/12/2024          Assessment / Plan    Diagnoses and all orders for this visit:    1. Attention deficit hyperactivity disorder, combined type (Primary)  -     lisdexamfetamine (Vyvanse) 30 MG capsule; Take 1 capsule by mouth Every Morning  Dispense: 30 capsule; Refill: 0    2. Generalized anxiety disorder  -     " sertraline (ZOLOFT) 50 MG tablet; Take 1 tablet by mouth Daily.  Dispense: 30 tablet; Refill: 1    3. Mild episode of recurrent major depressive disorder  -     sertraline (ZOLOFT) 50 MG tablet; Take 1 tablet by mouth Daily.  Dispense: 30 tablet; Refill: 1    4. Encounter for therapeutic drug monitoring  -     Compliance Drug Analysis, Ur - Urine, Clean Catch     Will discontinue Adderall XR and start Vyvanse daily. Continue Zoloft as ordered.     History and physical exam exhibit continued safe and appropriate use of controlled substance.    Obtained yearly urine drug screen and controlled substance agreement signed.     As part of patient's treatment plan I am prescribing a controlled substance.  The patient has been made aware of the appropriate use of such medications and potential for dependence and/or overdose.  It has also been made clear that these medications are for use by this patient only, without concomitant use of alcohol or other substances, unless prescribed.    Patient/guardian has completed a prescribing agreement detailing terms of continued prescribing of controlled substances, including monitoring DURAN reports, urine drug screening, and pill counts if necessary.  Patient is aware that inappropriate use will result in cessation of prescribing such medications.    PHQ-9 Depression Screening  Little interest or pleasure in doing things? Not at all   Feeling down, depressed, or hopeless? Not at all   Trouble falling or staying asleep, or sleeping too much? Not at all   Feeling tired or having little energy? Not at all   Poor appetite or overeating? Not at all   Feeling bad about yourself - or that you are a failure or have let yourself or your family down? Not at all   Trouble concentrating on things, such as reading the newspaper or watching television? Not at all   Moving or speaking so slowly that other people could have noticed? Or the opposite - being so fidgety or restless that you have been  moving around a lot more than usual? Not at all   Thoughts that you would be better off dead, or of hurting yourself in some way? Not at all   PHQ-9 Total Score 0   If you checked off any problems, how difficult have these problems made it for you to do your work, take care of things at home, or get along with other people? Not difficult at all     PHQ-9 Score:   PHQ-9 Total Score: 0    Depression Screening:  Patient screened positive for depression based on a PHQ-9 score of 0 on 5/6/2025. Follow-up recommendations include: Suicide Risk Assessment performed and Continue with medication management.        YURI-7  Over the last two weeks, how often have you been bothered by the following problems?  Feeling nervous, anxious or on edge: Not at all  Not being able to stop or control worrying: Not at all  Worrying too much about different things: Not at all  Trouble Relaxing: Not at all  Being so restless that it is hard to sit still: Not at all  Becoming easily annoyed or irritable: Not at all  Feeling afraid as if something awful might happen: Not at all  YURI 7 Total Score: 0  If you checked any problems, how difficult have these problems made it for you to do your work, take care of things at home, or get along with other people: Not difficult at all      ADHD  Screening for Adults With ADHD - (1-6)  1. How often do you have trouble wrapping up the final details of a project, once the challenging parts have been done?: Often  2. How often do you have difficulty getting things in order when you have to do a task that requires organization?: Very Often  3. How often do you have problems remembering appointments or obligations : Often  4. When you have a task that requires a lot of thought, how often do you avoid or delay getting started ?: Very Often  5. How often do you fidget or squirm with your hands or feet when you have to sit down for a long time?: Very Often  6. How often do you feel overly active and compelled to  do things, like you were driven by a motor?: Very Often  7. How often do you make careless mistakes when you have to work on a boring or difficult project?: Often  8. How often do have difficulty keeping your attention when you are doing boring or repetitive work?: Very Often  9. How often do you have difficulty concentrating on what people say to you, even when they are speaking to you: Very Often  10.How often do you misplace or have difficulty finding things at home or at work?: Very Often  11.How often are you distracted by activity or noise around you?: Often  12.How often do you leave your seat in meetings or other situations in which you are expected to remain seated?: Rarely  13.How often do you feel restless or fidgety?: Often  14.How often do you have difficulty unwinding and relaxing when you have time to yourself?: Very Often  15.How often do you find yourself talking too much when you are in social situations?: Sometimes  16.When you’re in a conversation, how often do you find yourself finishing the sentences of the people you are talking to, before they can finish them themselves?: Very Often  17.How often do you have difficulty waiting your turn in situations when turn taking is required?: Very Often  18.How often do you interrupt others when they are busy?: Sometimes    A psychological evaluation was conducted in order to assess past and current level of functioning. Areas assessed included, but were not limited to: perception of social support, perception of ability to face and deal with challenges in life (positive functioning), anxiety symptoms, depressive symptoms, perspective on beliefs/belief system, coping skills for stress, intelligence level,  Therapeutic rapport was established. Interventions conducted today were geared towards incorporating medication management along with support for continued therapy. Education was also provided as to the med management with this provider and what to  expect in subsequent sessions.      We discussed risks, benefits, goals and side effects of the above medication and the patient was agreeable with the plan. Patient was educated on the importance of compliance with treatment and follow-up appointments. Patient is aware to contact the [facility] with any worsening of symptoms. To call for questions or concerns and return early if necessary. Patent is agreeable to go to the Emergency Department or call 911 should they begin SI/HI.    MEDS ORDERED DURING VISIT:  New Medications Ordered This Visit   Medications    sertraline (ZOLOFT) 50 MG tablet     Sig: Take 1 tablet by mouth Daily.     Dispense:  30 tablet     Refill:  1    lisdexamfetamine (Vyvanse) 30 MG capsule     Sig: Take 1 capsule by mouth Every Morning     Dispense:  30 capsule     Refill:  0         Follow Up   Return in about 6 weeks (around 6/17/2025).  Patient was given instructions and counseling regarding her condition or for health maintenance advice. Please see specific information pulled into the AVS if appropriate.     TREATMENT PLAN/GOALS: Continue supportive psychotherapy efforts and medications as indicated. Treatment and medication options discussed during today's visit. Patient acknowledged and verbally consented to continue with current treatment plan and was educated on the importance of compliance with treatment and follow-up appointments.    MEDICATION ISSUES:  Discussed medication options and treatment plan of prescribed medication as well as the risks, benefits, and side effects including potential falls, possible impaired driving and metabolic adversities among others. Patient is agreeable to call the office with any worsening of symptoms or onset of side effects. Patient is agreeable to call 911 or go to the nearest ER should he/she begin having SI/HI.        [facility]  [address] [place] [address] [address] [place]  [place] [address]  [phone]    May 6, 2025 15:32 EDT

## 2025-05-14 LAB — DRUGS UR: NORMAL

## 2025-05-21 ENCOUNTER — TELEPHONE (OUTPATIENT)
Dept: FAMILY MEDICINE CLINIC | Facility: CLINIC | Age: 30
End: 2025-05-21
Payer: COMMERCIAL

## 2025-05-21 DIAGNOSIS — F90.2 ATTENTION DEFICIT HYPERACTIVITY DISORDER, COMBINED TYPE: Primary | ICD-10-CM

## 2025-05-21 RX ORDER — DEXTROAMPHETAMINE SACCHARATE, AMPHETAMINE ASPARTATE MONOHYDRATE, DEXTROAMPHETAMINE SULFATE AND AMPHETAMINE SULFATE 7.5; 7.5; 7.5; 7.5 MG/1; MG/1; MG/1; MG/1
30 CAPSULE, EXTENDED RELEASE ORAL DAILY
Qty: 30 CAPSULE | Refills: 0 | Status: SHIPPED | OUTPATIENT
Start: 2025-05-21

## 2025-05-21 NOTE — TELEPHONE ENCOUNTER
Patient says ever since starting the Vyvanse she has struggled with anxiety and mood swings. She is wondering if she can go back to Adderall XR 30MG

## 2025-06-19 ENCOUNTER — TELEMEDICINE (OUTPATIENT)
Dept: BEHAVIORAL HEALTH | Facility: CLINIC | Age: 30
End: 2025-06-19
Payer: COMMERCIAL

## 2025-06-19 DIAGNOSIS — F41.1 GENERALIZED ANXIETY DISORDER: ICD-10-CM

## 2025-06-19 DIAGNOSIS — F33.0 MILD EPISODE OF RECURRENT MAJOR DEPRESSIVE DISORDER: ICD-10-CM

## 2025-06-19 DIAGNOSIS — F90.2 ATTENTION DEFICIT HYPERACTIVITY DISORDER, COMBINED TYPE: Primary | ICD-10-CM

## 2025-06-19 PROCEDURE — 99214 OFFICE O/P EST MOD 30 MIN: CPT

## 2025-06-19 RX ORDER — DEXTROAMPHETAMINE SACCHARATE, AMPHETAMINE ASPARTATE MONOHYDRATE, DEXTROAMPHETAMINE SULFATE AND AMPHETAMINE SULFATE 7.5; 7.5; 7.5; 7.5 MG/1; MG/1; MG/1; MG/1
30 CAPSULE, EXTENDED RELEASE ORAL DAILY
Qty: 30 CAPSULE | Refills: 0 | Status: SHIPPED | OUTPATIENT
Start: 2025-06-19 | End: 2025-06-23 | Stop reason: SDUPTHER

## 2025-06-19 NOTE — PROGRESS NOTES
This provider is located at her home office in [place], [place] using a secure [employer] Video Visit through [employer]. Patient is being seen remotely via telehealth at their home address in [place], and stated they are in a secure environment for this session. The patient's condition being diagnosed/treated is appropriate for telemedicine. The provider identified herself as well as her credentials.   The patient, and/or patients guardian, consent to be seen remotely, and when consent is given they understand that the consent allows for patient identifiable information to be sent to a third party as needed.   They may refuse to be seen remotely at any time. The electronic data is encrypted and password protected, and the patient and/or guardian has been advised of the potential risks to privacy not withstanding such measures.    Video Visit    Patient Name: Della Martin  : 1995   MRN: [id number]   Care Team: Patient Care Team:  Julisa Elena APRN as PCP - General (Family Medicine)  Eri Gonzáles APRN as Nurse Practitioner (Behavioral Health)         Chief Complaint:    Chief Complaint   Patient presents with    ADHD    Anxiety    Depression    Med Management       History of Present Illness: Della Martin is a 30 y.o. female who presents via video visit for a medication management follow up.  Patient reports that the Vyvanse seemed to make her anxiety and mood worse.  She states that since switching back to the extended release Adderall she is doing much better.  She feels that her focus and concentration are doing good and her mood and anxiety have been managed well. She did not see the need to make any changes and did not have any medication concerns at today's visit. She denies SI/HI today.     The following portion of the patient's history were reviewed and updated appropriately: allergies, current and past medications, family history, medical history and social history. DURAN reviewed and  appropriate.   Subjective   Review of Systems:    Review of Systems   Respiratory: Negative.     Cardiovascular: Negative.  Negative for chest pain and palpitations.   Neurological: Negative.    Psychiatric/Behavioral: Negative.     All other systems reviewed and are negative.      Current Medications:   Current Outpatient Medications   Medication Sig Dispense Refill    amphetamine-dextroamphetamine XR (ADDERALL XR) 30 MG 24 hr capsule Take 1 capsule by mouth Daily 30 capsule 0    sertraline (ZOLOFT) 50 MG tablet Take 1 tablet by mouth Daily. 30 tablet 2     No current facility-administered medications for this visit.       Mental Status Exam:   Hygiene:   appears to be WNL   Cooperation:  Cooperative  Eye Contact:  Good  Psychomotor Behavior:  appears to be WNL  Affect:  Appropriate  Mood: normal  Speech:  Normal  Thought Process:  Goal directed and Linear  Thought Content:  Normal and Mood congruent  Suicidal:  None  Homicidal:  None  Hallucinations:  None  Delusion:  None  Memory:  Intact  Orientation:  Person, Place, Time, and Situation  Reliability:  good  Insight:  Good  Judgement:  Good  Impulse Control:  Good  Physical/Medical Issues:  Yes see chart      Objective   Vital Signs:   There were no vitals taken for this visit.         BP Readings from Last 3 Encounters:   05/06/25 124/80   03/18/24 116/72   11/21/23 110/64        Pulse Readings from Last 3 Encounters:   05/06/25 83   11/08/23 70   10/12/23 89        Lab Results:     Lab Results   Component Value Date    WBC 5.0 02/12/2024    HGB 13.1 02/12/2024    HCT 39.5 02/12/2024    MCV 90.4 02/12/2024     02/12/2024         Lab Results   Component Value Date    GLUCOSE 92 11/08/2023    BUN 12 11/08/2023    CREATININE 0.69 11/08/2023     11/08/2023    K 4.0 11/08/2023     11/08/2023    CALCIUM 9.3 11/08/2023    PROTEINTOT 7.6 11/08/2023    ALBUMIN 4.6 11/08/2023    ALT 30 11/08/2023    AST 21 11/08/2023    ALKPHOS 99 11/08/2023    BILITOT  0.3 11/08/2023    GLOB 3.0 11/08/2023    AGRATIO 1.5 11/08/2023    BCR 17.4 11/08/2023    ANIONGAP 9.5 11/08/2023    EGFR 122.9 11/08/2023         Lab Results   Component Value Date    CHLPL 178 02/12/2024    TRIG 101 02/12/2024    HDL 44 02/12/2024     (H) 02/12/2024       Assessment / Plan    Diagnoses and all orders for this visit:    1. Attention deficit hyperactivity disorder, combined type (Primary)  -     amphetamine-dextroamphetamine XR (ADDERALL XR) 30 MG 24 hr capsule; Take 1 capsule by mouth Daily  Dispense: 30 capsule; Refill: 0    2. Generalized anxiety disorder  -     sertraline (ZOLOFT) 50 MG tablet; Take 1 tablet by mouth Daily.  Dispense: 30 tablet; Refill: 2    3. Mild episode of recurrent major depressive disorder  -     sertraline (ZOLOFT) 50 MG tablet; Take 1 tablet by mouth Daily.  Dispense: 30 tablet; Refill: 2    Patient appears to be doing well on current medication. No issues reported and no indication for change. Will continue medication as ordered.     As part of patient's treatment plan I am prescribing a controlled substance.  The patient has been made aware of the appropriate use of such medications and potential for dependence and/or overdose.  It has also been made clear that these medications are for use by this patient only, without concomitant use of alcohol or other substances, unless prescribed.    Patient/guardian has completed a prescribing agreement detailing terms of continued prescribing of controlled substances, including monitoring DURAN reports, urine drug screening, and pill counts if necessary.  Patient is aware that inappropriate use will result in cessation of prescribing such medications.    A psychological evaluation was conducted in order to assess past and current level of functioning. Areas assessed included, but were not limited to: perception of social support, perception of ability to face and deal with challenges in life (positive functioning),  anxiety symptoms, depressive symptoms, perspective on beliefs/belief system, coping skills for stress, intelligence level,  Therapeutic rapport was established. Interventions conducted today were geared towards incorporating medication management along with support for continued therapy. Education was also provided as to the med management with this provider and what to expect in subsequent sessions.      We discussed risks, benefits, goals and side effects of the above medication and the patient was agreeable with the plan. Patient was educated on the importance of compliance with treatment and follow-up appointments. Patient is aware to contact the [facility] with any worsening of symptoms. To call for questions or concerns and return early if necessary. Patent is agreeable to go to the Emergency Department or call 911 should they begin SI/HI.        MEDS ORDERED DURING VISIT:  New Medications Ordered This Visit   Medications    amphetamine-dextroamphetamine XR (ADDERALL XR) 30 MG 24 hr capsule     Sig: Take 1 capsule by mouth Daily     Dispense:  30 capsule     Refill:  0    sertraline (ZOLOFT) 50 MG tablet     Sig: Take 1 tablet by mouth Daily.     Dispense:  30 tablet     Refill:  2         Follow Up   Return in about 3 months (around 9/19/2025).  Patient was given instructions and counseling regarding her condition or for health maintenance advice. Please see specific information pulled into the AVS if appropriate.     TREATMENT PLAN/GOALS: Continue supportive psychotherapy efforts and medications as indicated. Treatment and medication options discussed during today's visit. Patient acknowledged and verbally consented to continue with current treatment plan and was educated on the importance of compliance with treatment and follow-up appointments.    MEDICATION ISSUES:  Discussed medication options and treatment plan of prescribed medication as well as the risks, benefits, and side effects including  potential falls, possible impaired driving and metabolic adversities among others. Patient is agreeable to call the office with any worsening of symptoms or onset of side effects. Patient is agreeable to call 911 or go to the nearest ER should he/she begin having SI/HI.        [facility]  [address] [place] [address]  [phone]    June 19, 2025 16:21 EDT

## 2025-06-23 DIAGNOSIS — F90.2 ATTENTION DEFICIT HYPERACTIVITY DISORDER, COMBINED TYPE: ICD-10-CM

## 2025-06-23 RX ORDER — DEXTROAMPHETAMINE SACCHARATE, AMPHETAMINE ASPARTATE MONOHYDRATE, DEXTROAMPHETAMINE SULFATE AND AMPHETAMINE SULFATE 7.5; 7.5; 7.5; 7.5 MG/1; MG/1; MG/1; MG/1
30 CAPSULE, EXTENDED RELEASE ORAL DAILY
Qty: 30 CAPSULE | Refills: 0 | Status: SHIPPED | OUTPATIENT
Start: 2025-06-23

## 2025-07-22 DIAGNOSIS — F90.2 ATTENTION DEFICIT HYPERACTIVITY DISORDER, COMBINED TYPE: ICD-10-CM

## 2025-07-22 RX ORDER — DEXTROAMPHETAMINE SACCHARATE, AMPHETAMINE ASPARTATE MONOHYDRATE, DEXTROAMPHETAMINE SULFATE AND AMPHETAMINE SULFATE 7.5; 7.5; 7.5; 7.5 MG/1; MG/1; MG/1; MG/1
30 CAPSULE, EXTENDED RELEASE ORAL DAILY
Qty: 30 CAPSULE | Refills: 0 | Status: SHIPPED | OUTPATIENT
Start: 2025-07-22

## 2025-08-21 DIAGNOSIS — F90.2 ATTENTION DEFICIT HYPERACTIVITY DISORDER, COMBINED TYPE: ICD-10-CM

## 2025-08-21 RX ORDER — DEXTROAMPHETAMINE SACCHARATE, AMPHETAMINE ASPARTATE MONOHYDRATE, DEXTROAMPHETAMINE SULFATE AND AMPHETAMINE SULFATE 7.5; 7.5; 7.5; 7.5 MG/1; MG/1; MG/1; MG/1
30 CAPSULE, EXTENDED RELEASE ORAL DAILY
Qty: 30 CAPSULE | Refills: 0 | Status: SHIPPED | OUTPATIENT
Start: 2025-08-21

## (undated) DEVICE — ADHS SKIN PREMIERPRO EXOFIN TOPICAL HI/VISC .5ML

## (undated) DEVICE — RICH C-SECTION: Brand: MEDLINE INDUSTRIES, INC.

## (undated) DEVICE — STRIP,CLOSURE,WOUND,MEDI-STRIP,1/2X4: Brand: MEDLINE

## (undated) DEVICE — SUT VICRYL 3/0 CT1 27IN J258H

## (undated) DEVICE — SUT GUT CHRM 1 CTX 36IN 905H

## (undated) DEVICE — SLV SCD CALF HEMOFORCE DVT THERP REPROC MD

## (undated) DEVICE — LARGE, DISPOSABLE ALEXIS O C-SECTION PROTECTOR - RETRACTOR: Brand: ALEXIS ® O C-SECTION PROTECTOR - RETRACTOR

## (undated) DEVICE — SOL IRR NACL 0.9PCT BT 1000ML

## (undated) DEVICE — GLV SURG BIOGEL M LTX PF 8

## (undated) DEVICE — SUT VIC 4/0 KS 27IN VCP662H

## (undated) DEVICE — SUT PDS 0 CT 36IN DYED Z358T

## (undated) DEVICE — SUT GUT CHRM 2/0 SH 27IN G123H

## (undated) DEVICE — CUP EXTRCT VAC

## (undated) DEVICE — TBG PENCL TELESCP MEGADYNE SMOKE EVAC 10FT